# Patient Record
Sex: FEMALE | Race: ASIAN | NOT HISPANIC OR LATINO | Employment: FULL TIME | URBAN - METROPOLITAN AREA
[De-identification: names, ages, dates, MRNs, and addresses within clinical notes are randomized per-mention and may not be internally consistent; named-entity substitution may affect disease eponyms.]

---

## 2017-02-02 ENCOUNTER — ALLSCRIPTS OFFICE VISIT (OUTPATIENT)
Dept: OTHER | Facility: OTHER | Age: 42
End: 2017-02-02

## 2017-02-02 LAB
FLUAV AG SPEC QL IA: NEGATIVE
INFLUENZA B AG (HISTORICAL): NEGATIVE

## 2017-02-03 ENCOUNTER — ALLSCRIPTS OFFICE VISIT (OUTPATIENT)
Dept: OTHER | Facility: OTHER | Age: 42
End: 2017-02-03

## 2017-06-19 DIAGNOSIS — Z13.1 ENCOUNTER FOR SCREENING FOR DIABETES MELLITUS: ICD-10-CM

## 2017-06-19 DIAGNOSIS — E55.9 VITAMIN D DEFICIENCY: ICD-10-CM

## 2017-06-19 DIAGNOSIS — Z13.83 ENCOUNTER FOR SCREENING FOR RESPIRATORY DISORDER: ICD-10-CM

## 2017-06-19 DIAGNOSIS — Z13.89 ENCOUNTER FOR SCREENING FOR OTHER DISORDER: ICD-10-CM

## 2017-06-19 DIAGNOSIS — Z13.6 ENCOUNTER FOR SCREENING FOR CARDIOVASCULAR DISORDERS: ICD-10-CM

## 2017-06-19 DIAGNOSIS — Z00.00 ENCOUNTER FOR GENERAL ADULT MEDICAL EXAMINATION WITHOUT ABNORMAL FINDINGS: ICD-10-CM

## 2017-07-20 ENCOUNTER — GENERIC CONVERSION - ENCOUNTER (OUTPATIENT)
Dept: OTHER | Facility: OTHER | Age: 42
End: 2017-07-20

## 2017-07-20 ENCOUNTER — APPOINTMENT (OUTPATIENT)
Dept: LAB | Facility: CLINIC | Age: 42
End: 2017-07-20
Payer: COMMERCIAL

## 2017-07-20 DIAGNOSIS — Z13.6 ENCOUNTER FOR SCREENING FOR CARDIOVASCULAR DISORDERS: ICD-10-CM

## 2017-07-20 DIAGNOSIS — E55.9 VITAMIN D DEFICIENCY: ICD-10-CM

## 2017-07-20 DIAGNOSIS — Z13.83 ENCOUNTER FOR SCREENING FOR RESPIRATORY DISORDER: ICD-10-CM

## 2017-07-20 DIAGNOSIS — Z00.00 ENCOUNTER FOR GENERAL ADULT MEDICAL EXAMINATION WITHOUT ABNORMAL FINDINGS: ICD-10-CM

## 2017-07-20 DIAGNOSIS — Z13.1 ENCOUNTER FOR SCREENING FOR DIABETES MELLITUS: ICD-10-CM

## 2017-07-20 DIAGNOSIS — Z13.89 ENCOUNTER FOR SCREENING FOR OTHER DISORDER: ICD-10-CM

## 2017-07-20 LAB
25(OH)D3 SERPL-MCNC: 17 NG/ML (ref 30–100)
ALBUMIN SERPL BCP-MCNC: 3.8 G/DL (ref 3.5–5)
ALP SERPL-CCNC: 53 U/L (ref 46–116)
ALT SERPL W P-5'-P-CCNC: 17 U/L (ref 12–78)
ANION GAP SERPL CALCULATED.3IONS-SCNC: 3 MMOL/L (ref 4–13)
AST SERPL W P-5'-P-CCNC: 10 U/L (ref 5–45)
BILIRUB SERPL-MCNC: 0.61 MG/DL (ref 0.2–1)
BUN SERPL-MCNC: 16 MG/DL (ref 5–25)
CALCIUM SERPL-MCNC: 8.8 MG/DL (ref 8.3–10.1)
CHLORIDE SERPL-SCNC: 105 MMOL/L (ref 100–108)
CHOLEST SERPL-MCNC: 211 MG/DL (ref 50–200)
CO2 SERPL-SCNC: 29 MMOL/L (ref 21–32)
CREAT SERPL-MCNC: 0.73 MG/DL (ref 0.6–1.3)
EST. AVERAGE GLUCOSE BLD GHB EST-MCNC: 100 MG/DL
GFR SERPL CREATININE-BSD FRML MDRD: >60 ML/MIN/1.73SQ M
GLUCOSE P FAST SERPL-MCNC: 91 MG/DL (ref 65–99)
HBA1C MFR BLD: 5.1 % (ref 4.2–6.3)
HDLC SERPL-MCNC: 58 MG/DL (ref 40–60)
LDLC SERPL CALC-MCNC: 129 MG/DL (ref 0–100)
POTASSIUM SERPL-SCNC: 3.8 MMOL/L (ref 3.5–5.3)
PROT SERPL-MCNC: 7.5 G/DL (ref 6.4–8.2)
SODIUM SERPL-SCNC: 137 MMOL/L (ref 136–145)
TRIGL SERPL-MCNC: 122 MG/DL

## 2017-07-20 PROCEDURE — 83036 HEMOGLOBIN GLYCOSYLATED A1C: CPT

## 2017-07-20 PROCEDURE — 80061 LIPID PANEL: CPT

## 2017-07-20 PROCEDURE — 82306 VITAMIN D 25 HYDROXY: CPT

## 2017-07-20 PROCEDURE — 36415 COLL VENOUS BLD VENIPUNCTURE: CPT

## 2017-07-20 PROCEDURE — 80053 COMPREHEN METABOLIC PANEL: CPT

## 2017-07-24 ENCOUNTER — ALLSCRIPTS OFFICE VISIT (OUTPATIENT)
Dept: OTHER | Facility: OTHER | Age: 42
End: 2017-07-24

## 2017-07-24 DIAGNOSIS — E55.9 VITAMIN D DEFICIENCY: ICD-10-CM

## 2017-07-24 DIAGNOSIS — Z12.31 ENCOUNTER FOR SCREENING MAMMOGRAM FOR MALIGNANT NEOPLASM OF BREAST: ICD-10-CM

## 2018-01-13 VITALS
TEMPERATURE: 98 F | HEIGHT: 61 IN | DIASTOLIC BLOOD PRESSURE: 66 MMHG | BODY MASS INDEX: 21.52 KG/M2 | WEIGHT: 114 LBS | RESPIRATION RATE: 14 BRPM | SYSTOLIC BLOOD PRESSURE: 100 MMHG | HEART RATE: 82 BPM

## 2018-01-13 NOTE — RESULT NOTES
Verified Results  (1) VITAMIN D 25-HYDROXY [id number] 09:13AM Marcie Golden   TW Order Number: [id number]     Test Name Result Flag Reference   VIT D 25-HYDROX 17 0 ng/mL L 30 0-100 0   This assay is a certified procedure of the CDC Vitamin D Standardization Certification Program (VDSCP)     Deficiency <20ng/ml   Insufficiency 20-30ng/ml   Sufficient  ng/ml     *Patients undergoing fluorescein dye angiography may retain small amounts of fluorescein in the body for 48-72 hours post procedure  Samples containing fluorescein can produce falsely elevated Vitamin D values  If the patient had this procedure, a specimen should be resubmitted post fluorescein clearance

## 2018-01-14 VITALS
WEIGHT: 120 LBS | DIASTOLIC BLOOD PRESSURE: 70 MMHG | BODY MASS INDEX: 22.66 KG/M2 | RESPIRATION RATE: 14 BRPM | HEART RATE: 76 BPM | HEIGHT: 61 IN | TEMPERATURE: 97.9 F | SYSTOLIC BLOOD PRESSURE: 102 MMHG

## 2018-01-17 NOTE — PROGRESS NOTES
Assessment    1  Well adult on routine health check (V70 0) (Z00 00)   2  Vitamin D deficiency (268 9) (E55 9)   3  Elevated HDL (785 9) (E78 89)    Plan  Health Maintenance    · Calcium TABS; Take 1 tablet daily   · Multi-Vitamin TABS; Take once a day  Screening breast examination    · * MAMMO SCREENING BILATERAL W CAD; Status:Active; Requested for:[id number];   Vitamin D deficiency    · Vitamin D (Ergocalciferol) 69077 UNIT Oral Capsule; one capsule weekly for  12 weeks followed 2000u/d of otc vitamin D for maintenance   · Vitamin D 1000 UNIT CAPS; 1 every day   · (1) VITAMIN D 25-HYDROXY; Status:Active; Requested for:49Kmh7588;     Discussion/Summary  The patient was counseled regarding risk factor reductions, impressions  Possible side effects of new medications were reviewed with the patient/guardian today  The treatment plan was reviewed with the patient/guardian  The patient/guardian understands and agrees with the treatment plan      Chief Complaint  Seen for a CPE  er/cma  History of Present Illness  HM, Adult Female: The patient is being seen for a health maintenance evaluation  General Health: The patient's health since the last visit is described as good  Immunizations status: up to date  Lifestyle:  She consumes a diverse and healthy diet  She does not have any weight concerns  She does not use tobacco    Screening:      Review of Systems    Cardiovascular: no chest pain  Respiratory: no shortness of breath  Active Problems    1  Allergic rhinitis (477 9) (J30 9)   2  Body mass index (BMI) of 23 0 to 23 9 in adult (V85 1) (Z68 23)   3  Eczema (692 9) (L30 9)   4  Elevated HDL (785 9) (E78 89)   5  Immunization due (V05 9) (Z23)   6  Oral contraceptive prescribed (V25 01) (Z30 011)   7  Screening breast examination (V76 10) (Z12 31)   8  Screening for cardiovascular, respiratory, and genitourinary diseases   (V81 2,V81 4,V81 6) (Z13 6,Z13 83,Z13 89)   9   Screening for diabetes mellitus (DM) (V77 1) (Z13 1)   10  Thyroid disorder screening (V77 0) (Z13 29)   11  Vitamin D deficiency (268 9) (E55 9)   12  Well adult on routine health check (V70 0) (Z00 00)    Past Medical History    · History of Abnormal blood chemistry (790 6) (R79 9)   · History of Acute viral conjunctivitis of both eyes (077 99) (B30 9)   · History of Closed Fracture Of The Navicular Bone Of The Foot (825 22)   · History of Elevated ALT measurement (790 4) (R74 0)   · History of Exposure to influenza (V01 79) (Z20 828)   · History of hyperlipidemia (V12 29) (Z86 39)   · History of Influenza-like symptoms (780 99) (R68 89)   · Oral contraceptive prescribed (V25 01) (Z30 011)    Family History  Mother    · Family history of Diabetes Mellitus (V18 0)  Father    · Family history of No Significant Family History    Social History    · Being A Social Drinker   · Never A Smoker    Current Meds   1  Calcium TABS; Take 1 tablet daily; Therapy: (Recorded:69Nef8105) to Recorded   2  Multi-Vitamin TABS; Take once a day; Therapy: (Recorded:21Zip4497) to Recorded   3  Vitamin D 1000 UNIT CAPS; 1 every day; Therapy: 82GRP9689 to (Last Rx:63Hlr1329) Ordered    Allergies    1  No Known Drug Allergies    Vitals   Recorded: 24Jul2017 01:48PM   Temperature 97 5 F   Heart Rate 88   Respiration 16   Systolic 294   Diastolic 80   Height 5 ft 0 5 in   Weight 119 lb    BMI Calculated 22 86   BSA Calculated 1 51   LMP [id number]     Physical Exam    Constitutional   General appearance: No acute distress, well appearing and well nourished  Eyes   Conjunctiva and lids: No swelling, erythema or discharge  Pupils and irises: Equal, round, reactive to light  Ears, Nose, Mouth, and Throat   External inspection of ears and nose: Normal     Otoscopic examination: Tympanic membranes translucent with normal light reflex  Canals patent without erythema  Nasal mucosa, septum, and turbinates: Normal without edema or erythema      Lips, teeth, and gums: Normal, good dentition  Oropharynx: Normal with no erythema, edema, exudate or lesions  Neck   Neck: Supple, symmetric, trachea midline, no masses  Pulmonary   Respiratory effort: No increased work of breathing or signs of respiratory distress  Auscultation of lungs: Clear to auscultation  Cardiovascular   Auscultation of heart: Normal rate and rhythm, normal S1 and S2, no murmurs  Carotid pulses: 2+ bilaterally  Pedal pulses: 2+ bilaterally  Examination of extremities for edema and/or varicosities: Normal     Abdomen   Abdomen: Non-tender, no masses  Liver and spleen: No hepatomegaly or splenomegaly  Lymphatic   Palpation of lymph nodes in neck: No lymphadenopathy  Musculoskeletal   Gait and station: Normal     Digits and nails: Normal without clubbing or cyanosis  Joints, bones, and muscles: Normal     Range of motion: Normal     Skin   Skin and subcutaneous tissue: Normal without rashes or lesions  Palpation of skin and subcutaneous tissue: Normal turgor  Neurologic   Reflexes: 2+ and symmetric  Sensation: No sensory loss  Psychiatric   Judgment and insight: Normal     Mood and affect: Normal        Results/Data  [place] Risk for General CVD [id number] 02:08PM Bearl Benjamin     Test Name Result Flag Reference   [place] CVD - Heart Age 40 Years     [place] CVD - Normal 2 5 %     [place] CVD - Ten Year 2 2 %     Sex: Female  Age: 38  Total Cholesterol: 211 mg/dL  HDL Cholesterol: 58 mg/dL  Systolic Blood Pressure: 824 mmHg  Diabetes: No  Smoking Status: No  Being treated for hypertension: No       Procedure    Procedure: Visual Acuity Test    Indication: routine screening  Inforrmation supplied by a Snellen chart     Results: 20/13 in both eyes with corrective device, 20/15 in the right eye with corrective device, 20/20 in the left eye with corrective device      Provider Comments  Provider Comments:   D in 4m, f/u only if needed      Signatures Electronically signed by : Tim Moran DO; Jul 24 2017  3:02PM EST                       (Author)

## 2018-01-18 NOTE — PROGRESS NOTES
Assessment    1  Encounter for preventive health examination (V70 0) (Z00 00)   2  Vitamin D insufficiency (268 9) (E55 9)   3  Elevated HDL (785 9) (E78 89)   4  Body mass index (BMI) 21 0-21 9, adult (V85 1) (Z68 21)    Plan  Body mass index (BMI) 21 0-21 9, adult, Elevated HDL, Vitamin D insufficiency    · Follow-up PRN Evaluation and Treatment  Follow-up  Status: Complete  Done:  [id number]    Discussion/Summary  Advice and education were given regarding calcium supplements and vitamin D supplements  Chief Complaint  pt present for a CPE  pt declines an eye exam  hg      History of Present Illness  HM, Adult Female: The patient is being seen for a health maintenance evaluation  General Health: The patient's health since the last visit is described as good  Immunizations status: up to date  Lifestyle:  She consumes a diverse and healthy diet  She does not use tobacco  She denies alcohol use  Screening:      Review of Systems    Cardiovascular: no chest pain  Respiratory: no shortness of breath  Neurological: no fainting  Active Problems    1  Abnormal blood chemistry (790 6) (R79 9)   2  Allergic rhinitis (477 9) (J30 9)   3  Eczema (692 9) (L30 9)   4  Elevated HDL (785 9) (E78 89)   5  Immunization due (V05 9) (Z23)   6  Oral contraceptive prescribed (V25 01) (Z30 011)   7  Screening for cardiovascular condition (V81 2) (Z13 6)   8  Screening for diabetes mellitus (DM) (V77 1) (Z13 1)   9  Screening for lipid disorders (V77 91) (Z13 220)   10  Thyroid disorder screening (V77 0) (Z13 29)   11  Vitamin D insufficiency (268 9) (E55 9)   12   Well adult on routine health check (V70 0) (Z00 00)    Past Medical History    · History of Closed Fracture Of The Navicular Bone Of The Foot (825 22)   · History of Elevated ALT measurement (790 4) (R74 0)   · History of hyperlipidemia (V12 29) (Z86 39)   · Oral contraceptive prescribed (V25 01) (Z30 011)    Family History  Mother    · Family history of Diabetes Mellitus (V18 0)  Father    · Family history of No Significant Family History    Social History    · Being A Social Drinker   · Never A Smoker    Current Meds   1  Calcium TABS; Therapy: (Recorded:89Dvv7874) to Recorded   2  Levocetirizine Dihydrochloride 5 MG Oral Tablet; 1 every day; Therapy: 24DAK6781 to (Evaluate:09Nov2015); Last Rx:13May2015 Ordered   3  Multi-Vitamin TABS; Take once a day; Therapy: (Recorded:13May2015) to Recorded   4  Vitamin D 1000 UNIT CAPS; 1 every day; Therapy: 51JUC1006 to (Last Rx:13May2015) Ordered    Allergies    1  No Known Drug Allergies    Vitals   Recorded: 09YHF6311 11:58AM   Temperature 98 F    Heart Rate  86   Respiration  16   Systolic 870    Diastolic 60    Height 5 ft 2 in    Weight 116 lb     BMI Calculated 21 22    BSA Calculated 1 52    LMP  42Hfw5835 irregular     Physical Exam    Constitutional   General appearance: No acute distress, well appearing and well nourished  Eyes   Conjunctiva and lids: No swelling, erythema or discharge  Pupils and irises: Equal, round, reactive to light  Ears, Nose, Mouth, and Throat   External inspection of ears and nose: Normal     Otoscopic examination: Tympanic membranes translucent with normal light reflex  Canals patent without erythema  Nasal mucosa, septum, and turbinates: Normal without edema or erythema  Lips, teeth, and gums: Normal, good dentition  Oropharynx: Normal with no erythema, edema, exudate or lesions  Neck   Neck: Supple, symmetric, trachea midline, no masses  Thyroid: Normal, no thyromegaly  Pulmonary   Respiratory effort: No increased work of breathing or signs of respiratory distress  Auscultation of lungs: Clear to auscultation  Cardiovascular   Palpation of heart: Normal PMI, no thrills  Auscultation of heart: Normal rate and rhythm, normal S1 and S2, no murmurs  Pedal pulses: 2+ bilaterally      Examination of extremities for edema and/or varicosities: Normal     Chest   Breasts: Normal, no dimpling or skin changes appreciated  Palpation of breasts and axillae: Normal, no masses palpated  Abdomen   Abdomen: Non-tender, no masses  Liver and spleen: No hepatomegaly or splenomegaly  Lymphatic   Palpation of lymph nodes in neck: No lymphadenopathy  Musculoskeletal   Gait and station: Normal     Digits and nails: Normal without clubbing or cyanosis  Stability: Normal     Skin   Skin and subcutaneous tissue: Normal without rashes or lesions  Palpation of skin and subcutaneous tissue: Normal turgor  Neurologic   Reflexes: 2+ and symmetric  Sensation: No sensory loss      Psychiatric   Judgment and insight: Normal     Mood and affect: Normal        Signatures   Electronically signed by : Foreign Galicia DO; May 17 2016 11:09PM EST                       (Author)

## 2018-01-22 VITALS
RESPIRATION RATE: 16 BRPM | BODY MASS INDEX: 22.47 KG/M2 | HEIGHT: 61 IN | WEIGHT: 119 LBS | TEMPERATURE: 97.5 F | DIASTOLIC BLOOD PRESSURE: 80 MMHG | HEART RATE: 88 BPM | SYSTOLIC BLOOD PRESSURE: 118 MMHG

## 2018-06-22 ENCOUNTER — TELEPHONE (OUTPATIENT)
Dept: FAMILY MEDICINE CLINIC | Facility: CLINIC | Age: 43
End: 2018-06-22

## 2018-06-22 NOTE — TELEPHONE ENCOUNTER
Patient called and states she needs a bloodwork order before her CPE appt in August     She stated it needs to include all the blood work that is needed for Kamari Yoo 187 with you     Please call her and let her know it is done

## 2018-06-24 DIAGNOSIS — Z13.83 SCREENING FOR CARDIOVASCULAR, RESPIRATORY, AND GENITOURINARY DISEASES: ICD-10-CM

## 2018-06-24 DIAGNOSIS — Z13.1 SCREENING FOR DIABETES MELLITUS (DM): ICD-10-CM

## 2018-06-24 DIAGNOSIS — Z13.6 SCREENING FOR CARDIOVASCULAR, RESPIRATORY, AND GENITOURINARY DISEASES: ICD-10-CM

## 2018-06-24 DIAGNOSIS — Z13.89 SCREENING FOR CARDIOVASCULAR, RESPIRATORY, AND GENITOURINARY DISEASES: ICD-10-CM

## 2018-06-24 DIAGNOSIS — E55.9 VITAMIN D DEFICIENCY: Primary | ICD-10-CM

## 2018-06-24 RX ORDER — MULTIVITAMIN
TABLET ORAL
COMMUNITY

## 2018-06-24 RX ORDER — MULTIVIT-MIN/IRON/FOLIC ACID/K 18-600-40
CAPSULE ORAL DAILY
COMMUNITY
Start: 2015-05-13

## 2018-07-25 ENCOUNTER — TRANSCRIBE ORDERS (OUTPATIENT)
Dept: ADMINISTRATIVE | Facility: HOSPITAL | Age: 43
End: 2018-07-25

## 2018-07-25 ENCOUNTER — APPOINTMENT (OUTPATIENT)
Dept: LAB | Facility: HOSPITAL | Age: 43
End: 2018-07-25
Attending: FAMILY MEDICINE
Payer: COMMERCIAL

## 2018-07-25 DIAGNOSIS — Z13.6 SCREENING FOR CARDIOVASCULAR, RESPIRATORY, AND GENITOURINARY DISEASES: ICD-10-CM

## 2018-07-25 DIAGNOSIS — Z13.89 SCREENING FOR CARDIOVASCULAR, RESPIRATORY, AND GENITOURINARY DISEASES: ICD-10-CM

## 2018-07-25 DIAGNOSIS — Z13.83 SCREENING FOR CARDIOVASCULAR, RESPIRATORY, AND GENITOURINARY DISEASES: ICD-10-CM

## 2018-07-25 DIAGNOSIS — Z13.1 SCREENING FOR DIABETES MELLITUS (DM): ICD-10-CM

## 2018-07-25 DIAGNOSIS — E55.9 VITAMIN D DEFICIENCY: ICD-10-CM

## 2018-07-25 LAB
25(OH)D3 SERPL-MCNC: 19.3 NG/ML (ref 30–100)
ALBUMIN SERPL BCP-MCNC: 3.6 G/DL (ref 3.5–5)
ALP SERPL-CCNC: 48 U/L (ref 46–116)
ALT SERPL W P-5'-P-CCNC: 16 U/L (ref 12–78)
ANION GAP SERPL CALCULATED.3IONS-SCNC: 7 MMOL/L (ref 4–13)
AST SERPL W P-5'-P-CCNC: 13 U/L (ref 5–45)
BILIRUB SERPL-MCNC: 0.7 MG/DL (ref 0.2–1)
BUN SERPL-MCNC: 13 MG/DL (ref 5–25)
CALCIUM SERPL-MCNC: 8.6 MG/DL (ref 8.3–10.1)
CHLORIDE SERPL-SCNC: 105 MMOL/L (ref 100–108)
CHOLEST SERPL-MCNC: 180 MG/DL (ref 50–200)
CO2 SERPL-SCNC: 29 MMOL/L (ref 21–32)
CREAT SERPL-MCNC: 0.74 MG/DL (ref 0.6–1.3)
EST. AVERAGE GLUCOSE BLD GHB EST-MCNC: 100 MG/DL
GFR SERPL CREATININE-BSD FRML MDRD: 101 ML/MIN/1.73SQ M
GLUCOSE P FAST SERPL-MCNC: 98 MG/DL (ref 65–99)
HBA1C MFR BLD: 5.1 % (ref 4.2–6.3)
HDLC SERPL-MCNC: 59 MG/DL (ref 40–60)
LDLC SERPL CALC-MCNC: 107 MG/DL (ref 0–100)
POTASSIUM SERPL-SCNC: 3.8 MMOL/L (ref 3.5–5.3)
PROT SERPL-MCNC: 7 G/DL (ref 6.4–8.2)
SODIUM SERPL-SCNC: 141 MMOL/L (ref 136–145)
TRIGL SERPL-MCNC: 69 MG/DL

## 2018-07-25 PROCEDURE — 36415 COLL VENOUS BLD VENIPUNCTURE: CPT

## 2018-07-25 PROCEDURE — 80053 COMPREHEN METABOLIC PANEL: CPT

## 2018-07-25 PROCEDURE — 80061 LIPID PANEL: CPT

## 2018-07-25 PROCEDURE — 83036 HEMOGLOBIN GLYCOSYLATED A1C: CPT

## 2018-07-25 PROCEDURE — 82306 VITAMIN D 25 HYDROXY: CPT

## 2018-08-28 ENCOUNTER — OFFICE VISIT (OUTPATIENT)
Dept: FAMILY MEDICINE CLINIC | Facility: CLINIC | Age: 43
End: 2018-08-28
Payer: COMMERCIAL

## 2018-08-28 VITALS
HEIGHT: 62 IN | BODY MASS INDEX: 21.71 KG/M2 | RESPIRATION RATE: 16 BRPM | HEART RATE: 64 BPM | SYSTOLIC BLOOD PRESSURE: 102 MMHG | DIASTOLIC BLOOD PRESSURE: 60 MMHG | WEIGHT: 118 LBS | TEMPERATURE: 98.4 F

## 2018-08-28 DIAGNOSIS — Z00.00 HEALTHCARE MAINTENANCE: Primary | ICD-10-CM

## 2018-08-28 DIAGNOSIS — E55.9 VITAMIN D DEFICIENCY: ICD-10-CM

## 2018-08-28 DIAGNOSIS — Z12.39 BREAST CANCER SCREENING: ICD-10-CM

## 2018-08-28 PROCEDURE — 99396 PREV VISIT EST AGE 40-64: CPT | Performed by: FAMILY MEDICINE

## 2018-08-28 RX ORDER — ERGOCALCIFEROL 1.25 MG/1
50000 CAPSULE ORAL WEEKLY
Qty: 12 CAPSULE | Refills: 0 | Status: SHIPPED | OUTPATIENT
Start: 2018-08-28 | End: 2021-08-08

## 2018-08-28 NOTE — PROGRESS NOTES
Assessment/Plan:    No problem-specific Assessment & Plan notes found for this encounter  cpe done  First mammo ordered again  otc vit D after rx and recheck 4m     Diagnoses and all orders for this visit:    Healthcare maintenance    Vitamin D deficiency  -     ergocalciferol (VITAMIN D2) 50,000 units; Take 1 capsule (50,000 Units total) by mouth once a week for 90 days Then 2000units/d of otc Vitamin D  -     Vitamin D 25 hydroxy; Future    Breast cancer screening  -     Mammo screening bilateral w cad; Future              No Follow-up on file  Subjective:      Patient ID: aKdy Jeffers is a 41 y o  female  Chief Complaint   Patient presents with    Physical Exam     no forms drhlpn       HPI  Working in [employer]  Exercises most days  Eating better  Took drisdol last year, on daily 1600u/d    The following portions of the patient's history were reviewed and updated as appropriate: allergies, current medications, past family history, past medical history, past social history, past surgical history and problem list     Review of Systems   Respiratory: Negative for shortness of breath  Cardiovascular: Negative for chest pain  Gastrointestinal: Negative for blood in stool  Genitourinary: Negative for difficulty urinating  Current Outpatient Prescriptions   Medication Sig Dispense Refill    Cholecalciferol (VITAMIN D) 2000 units CAPS Take by mouth daily      Multiple Vitamin (MULTI-VITAMIN DAILY) TABS Take by mouth      ergocalciferol (VITAMIN D2) 50,000 units Take 1 capsule (50,000 Units total) by mouth once a week for 90 days Then 2000units/d of otc Vitamin D 12 capsule 0     No current facility-administered medications for this visit  Objective:    /60   Pulse 64   Temp 98 4 °F (36 9 °C)   Resp 16   Ht 5' 2" (1 575 m)   Wt 53 5 kg (118 lb)   LMP 08/25/2018   BMI 21 58 kg/m²        Physical Exam   Constitutional: She appears well-developed     HENT:   Head: Normocephalic  Eyes: Conjunctivae are normal    Neck: Neck supple  Cardiovascular: Normal rate and intact distal pulses  Pulmonary/Chest: Effort normal  No respiratory distress  Abdominal: Soft  Musculoskeletal: She exhibits no edema or deformity  Neurological: She is alert  Skin: Skin is warm and dry  Psychiatric: Her behavior is normal  Thought content normal    Nursing note and vitals reviewed               Casandra Clifton DO

## 2021-01-04 ENCOUNTER — IMMUNIZATIONS (OUTPATIENT)
Dept: FAMILY MEDICINE CLINIC | Facility: HOSPITAL | Age: 46
End: 2021-01-04

## 2021-01-04 DIAGNOSIS — Z23 ENCOUNTER FOR IMMUNIZATION: ICD-10-CM

## 2021-01-04 PROCEDURE — 0011A SARS-COV-2 / COVID-19 MRNA VACCINE (MODERNA) 100 MCG: CPT

## 2021-01-04 PROCEDURE — 91301 SARS-COV-2 / COVID-19 MRNA VACCINE (MODERNA) 100 MCG: CPT

## 2021-02-05 ENCOUNTER — IMMUNIZATIONS (OUTPATIENT)
Dept: FAMILY MEDICINE CLINIC | Facility: HOSPITAL | Age: 46
End: 2021-02-05

## 2021-02-05 DIAGNOSIS — Z23 ENCOUNTER FOR IMMUNIZATION: Primary | ICD-10-CM

## 2021-02-05 PROCEDURE — 0012A SARS-COV-2 / COVID-19 MRNA VACCINE (MODERNA) 100 MCG: CPT

## 2021-02-05 PROCEDURE — 91301 SARS-COV-2 / COVID-19 MRNA VACCINE (MODERNA) 100 MCG: CPT

## 2021-03-17 ENCOUNTER — TELEPHONE (OUTPATIENT)
Dept: FAMILY MEDICINE CLINIC | Facility: CLINIC | Age: 46
End: 2021-03-17

## 2021-03-24 NOTE — TELEPHONE ENCOUNTER
Spoke to patient, she said she has been busy and will call back to schedule her CPE after checking her schedule     Mercedes Chery MA

## 2021-07-01 ENCOUNTER — TELEPHONE (OUTPATIENT)
Dept: FAMILY MEDICINE CLINIC | Facility: HOSPITAL | Age: 46
End: 2021-07-01

## 2021-07-01 DIAGNOSIS — Z13.6 SCREENING FOR CARDIOVASCULAR, RESPIRATORY, AND GENITOURINARY DISEASES: ICD-10-CM

## 2021-07-01 DIAGNOSIS — Z13.1 SCREENING FOR DIABETES MELLITUS (DM): ICD-10-CM

## 2021-07-01 DIAGNOSIS — Z13.83 SCREENING FOR CARDIOVASCULAR, RESPIRATORY, AND GENITOURINARY DISEASES: ICD-10-CM

## 2021-07-01 DIAGNOSIS — Z13.89 SCREENING FOR CARDIOVASCULAR, RESPIRATORY, AND GENITOURINARY DISEASES: ICD-10-CM

## 2021-07-01 DIAGNOSIS — E55.9 VITAMIN D DEFICIENCY: Primary | ICD-10-CM

## 2021-08-08 RX ORDER — PSYLLIUM HUSK 0.4 G
CAPSULE ORAL
COMMUNITY

## 2021-08-09 ENCOUNTER — APPOINTMENT (OUTPATIENT)
Dept: LAB | Facility: CLINIC | Age: 46
End: 2021-08-09
Payer: COMMERCIAL

## 2021-08-09 ENCOUNTER — APPOINTMENT (OUTPATIENT)
Dept: LAB | Facility: CLINIC | Age: 46
End: 2021-08-09

## 2021-08-09 DIAGNOSIS — Z13.83 SCREENING FOR CARDIOVASCULAR, RESPIRATORY, AND GENITOURINARY DISEASES: ICD-10-CM

## 2021-08-09 DIAGNOSIS — Z13.1 SCREENING FOR DIABETES MELLITUS (DM): ICD-10-CM

## 2021-08-09 DIAGNOSIS — E55.9 VITAMIN D DEFICIENCY: ICD-10-CM

## 2021-08-09 DIAGNOSIS — Z13.89 SCREENING FOR CARDIOVASCULAR, RESPIRATORY, AND GENITOURINARY DISEASES: ICD-10-CM

## 2021-08-09 DIAGNOSIS — Z13.6 SCREENING FOR CARDIOVASCULAR, RESPIRATORY, AND GENITOURINARY DISEASES: ICD-10-CM

## 2021-08-09 LAB
25(OH)D3 SERPL-MCNC: 31.4 NG/ML (ref 30–100)
ALBUMIN SERPL BCP-MCNC: 3.6 G/DL (ref 3.5–5)
ALP SERPL-CCNC: 54 U/L (ref 46–116)
ALT SERPL W P-5'-P-CCNC: 16 U/L (ref 12–78)
ANION GAP SERPL CALCULATED.3IONS-SCNC: 3 MMOL/L (ref 4–13)
AST SERPL W P-5'-P-CCNC: 10 U/L (ref 5–45)
BILIRUB SERPL-MCNC: 0.68 MG/DL (ref 0.2–1)
BUN SERPL-MCNC: 10 MG/DL (ref 5–25)
CALCIUM SERPL-MCNC: 8.9 MG/DL (ref 8.3–10.1)
CHLORIDE SERPL-SCNC: 107 MMOL/L (ref 100–108)
CHOLEST SERPL-MCNC: 208 MG/DL (ref 50–200)
CO2 SERPL-SCNC: 27 MMOL/L (ref 21–32)
CREAT SERPL-MCNC: 0.68 MG/DL (ref 0.6–1.3)
EST. AVERAGE GLUCOSE BLD GHB EST-MCNC: 100 MG/DL
GFR SERPL CREATININE-BSD FRML MDRD: 107 ML/MIN/1.73SQ M
GLUCOSE P FAST SERPL-MCNC: 95 MG/DL (ref 65–99)
HBA1C MFR BLD: 5.1 %
HDLC SERPL-MCNC: 53 MG/DL
LDLC SERPL CALC-MCNC: 134 MG/DL (ref 0–100)
POTASSIUM SERPL-SCNC: 3.8 MMOL/L (ref 3.5–5.3)
PROT SERPL-MCNC: 7.2 G/DL (ref 6.4–8.2)
SODIUM SERPL-SCNC: 137 MMOL/L (ref 136–145)
TRIGL SERPL-MCNC: 104 MG/DL

## 2021-08-09 PROCEDURE — 80053 COMPREHEN METABOLIC PANEL: CPT

## 2021-08-09 PROCEDURE — 82306 VITAMIN D 25 HYDROXY: CPT

## 2021-08-09 PROCEDURE — 80061 LIPID PANEL: CPT

## 2021-08-09 PROCEDURE — 83036 HEMOGLOBIN GLYCOSYLATED A1C: CPT

## 2021-08-09 PROCEDURE — 36415 COLL VENOUS BLD VENIPUNCTURE: CPT

## 2021-08-10 ENCOUNTER — OFFICE VISIT (OUTPATIENT)
Dept: FAMILY MEDICINE CLINIC | Facility: CLINIC | Age: 46
End: 2021-08-10
Payer: COMMERCIAL

## 2021-08-10 VITALS
WEIGHT: 125 LBS | DIASTOLIC BLOOD PRESSURE: 80 MMHG | HEIGHT: 61 IN | SYSTOLIC BLOOD PRESSURE: 110 MMHG | RESPIRATION RATE: 16 BRPM | TEMPERATURE: 98.5 F | OXYGEN SATURATION: 99 % | HEART RATE: 72 BPM | BODY MASS INDEX: 23.6 KG/M2

## 2021-08-10 DIAGNOSIS — Z00.00 HEALTHCARE MAINTENANCE: Primary | ICD-10-CM

## 2021-08-10 DIAGNOSIS — Z86.39 HISTORY OF VITAMIN D DEFICIENCY: ICD-10-CM

## 2021-08-10 DIAGNOSIS — Z91.89 FRAMINGHAM CARDIAC RISK <10% IN NEXT 10 YEARS: ICD-10-CM

## 2021-08-10 DIAGNOSIS — Z12.31 BREAST CANCER SCREENING BY MAMMOGRAM: ICD-10-CM

## 2021-08-10 PROCEDURE — 99396 PREV VISIT EST AGE 40-64: CPT | Performed by: FAMILY MEDICINE

## 2021-08-10 RX ORDER — CLOBETASOL PROPIONATE 0.5 MG/G
CREAM TOPICAL
COMMUNITY
Start: 2021-07-16

## 2021-08-10 NOTE — PROGRESS NOTES
Assessment/Plan:    No problem-specific Assessment & Plan notes found for this encounter  cpe  Local gyn for pap  mammo ordered  Continue vit D, could take a little more     Diagnoses and all orders for this visit:    Healthcare maintenance  -     Ambulatory referral to Obstetrics / Gynecology; Future    [place] cardiac risk <10% in next 10 years    Breast cancer screening by mammogram  -     Mammo screening bilateral w 3d & cad; Future    History of vitamin D deficiency    Other orders  -     Calcium Carb-Cholecalciferol (Calcium 1000 + D) 1000-800 MG-UNIT TABS  -     clobetasol (TEMOVATE) 0 05 % cream; As Needed        Return if symptoms worsen or fail to improve  Subjective:      Patient ID: Phill Romano is a 42 y o  female  Chief Complaint   Patient presents with    Physical Exam     wmcma       HPI  Here for cpe  Small wt gain  Eats rice  Not limiting carbs much  2x/w exercise  2 kids, ages 12 and 8    Now on vit D 2K daily  D level is good    The following portions of the patient's history were reviewed and updated as appropriate: allergies, current medications, past family history, past medical history, past social history, past surgical history and problem list     Review of Systems   Constitutional: Negative for fever  Respiratory: Negative for shortness of breath  Current Outpatient Medications   Medication Sig Dispense Refill    Calcium Carb-Cholecalciferol (Calcium 1000 + D) 1000-800 MG-UNIT TABS       Cholecalciferol (VITAMIN D) 2000 units CAPS Take by mouth daily      clobetasol (TEMOVATE) 0 05 % cream As Needed      Multiple Vitamin (MULTI-VITAMIN DAILY) TABS Take by mouth       No current facility-administered medications for this visit  Objective:    /80   Pulse 72   Temp 98 5 °F (36 9 °C)   Resp 16   Ht 5' 1" (1 549 m)   Wt 56 7 kg (125 lb)   SpO2 99%   BMI 23 62 kg/m²        Physical Exam  Vitals and nursing note reviewed     Constitutional: General: She is not in acute distress  Appearance: She is well-developed  She is not ill-appearing  HENT:      Head: Normocephalic  Right Ear: Tympanic membrane normal       Left Ear: Tympanic membrane normal    Eyes:      General: No scleral icterus  Conjunctiva/sclera: Conjunctivae normal    Neck:      Thyroid: No thyromegaly  Cardiovascular:      Rate and Rhythm: Normal rate and regular rhythm  Heart sounds: No murmur heard  Pulmonary:      Effort: Pulmonary effort is normal  No respiratory distress  Breath sounds: No wheezing  Abdominal:      General: There is no distension  Palpations: Abdomen is soft  Tenderness: There is no abdominal tenderness  Musculoskeletal:         General: No deformity  Cervical back: Neck supple  No tenderness  Right lower leg: No edema  Left lower leg: No edema  Skin:     General: Skin is warm and dry  Coloration: Skin is not pale  Neurological:      Mental Status: She is alert  Motor: No weakness  Gait: Gait normal    Psychiatric:         Mood and Affect: Mood normal          Behavior: Behavior normal          Thought Content:  Thought content normal                 Alan Raman DO

## 2021-12-17 ENCOUNTER — IMMUNIZATIONS (OUTPATIENT)
Dept: FAMILY MEDICINE CLINIC | Facility: HOSPITAL | Age: 46
End: 2021-12-17

## 2021-12-17 DIAGNOSIS — Z23 ENCOUNTER FOR IMMUNIZATION: Primary | ICD-10-CM

## 2021-12-17 PROCEDURE — 91306 COVID-19 MODERNA VACC 0.25 ML BOOSTER: CPT

## 2021-12-17 PROCEDURE — 0064A COVID-19 MODERNA VACC 0.25 ML BOOSTER: CPT

## 2022-08-11 ENCOUNTER — APPOINTMENT (OUTPATIENT)
Dept: LAB | Facility: CLINIC | Age: 47
End: 2022-08-11

## 2022-08-11 ENCOUNTER — TRANSCRIBE ORDERS (OUTPATIENT)
Dept: LAB | Facility: CLINIC | Age: 47
End: 2022-08-11

## 2022-08-11 DIAGNOSIS — Z00.8 ENCOUNTER FOR OTHER GENERAL EXAMINATION: Primary | ICD-10-CM

## 2022-08-11 LAB
CHOLEST SERPL-MCNC: 214 MG/DL
EST. AVERAGE GLUCOSE BLD GHB EST-MCNC: 103 MG/DL
HBA1C MFR BLD: 5.2 %
HDLC SERPL-MCNC: 54 MG/DL
LDLC SERPL CALC-MCNC: 130 MG/DL (ref 0–100)
NONHDLC SERPL-MCNC: 160 MG/DL
TRIGL SERPL-MCNC: 151 MG/DL

## 2022-08-11 PROCEDURE — 36415 COLL VENOUS BLD VENIPUNCTURE: CPT

## 2022-08-11 PROCEDURE — 80061 LIPID PANEL: CPT

## 2022-08-11 PROCEDURE — 83036 HEMOGLOBIN GLYCOSYLATED A1C: CPT

## 2022-08-12 ENCOUNTER — OFFICE VISIT (OUTPATIENT)
Dept: FAMILY MEDICINE CLINIC | Facility: CLINIC | Age: 47
End: 2022-08-12
Payer: COMMERCIAL

## 2022-08-12 VITALS
BODY MASS INDEX: 22.92 KG/M2 | HEIGHT: 61 IN | WEIGHT: 121.4 LBS | SYSTOLIC BLOOD PRESSURE: 116 MMHG | RESPIRATION RATE: 17 BRPM | DIASTOLIC BLOOD PRESSURE: 84 MMHG | TEMPERATURE: 96.9 F | HEART RATE: 95 BPM | OXYGEN SATURATION: 97 %

## 2022-08-12 DIAGNOSIS — Z13.1 SCREENING FOR DIABETES MELLITUS (DM): ICD-10-CM

## 2022-08-12 DIAGNOSIS — Z12.31 BREAST CANCER SCREENING BY MAMMOGRAM: ICD-10-CM

## 2022-08-12 DIAGNOSIS — Z86.39 HISTORY OF VITAMIN D DEFICIENCY: ICD-10-CM

## 2022-08-12 DIAGNOSIS — Z00.00 HEALTHCARE MAINTENANCE: Primary | ICD-10-CM

## 2022-08-12 DIAGNOSIS — Z12.11 COLON CANCER SCREENING: ICD-10-CM

## 2022-08-12 DIAGNOSIS — R53.83 OTHER FATIGUE: ICD-10-CM

## 2022-08-12 PROCEDURE — 99396 PREV VISIT EST AGE 40-64: CPT | Performed by: FAMILY MEDICINE

## 2022-08-12 NOTE — PROGRESS NOTES
Assessment/Plan:    No problem-specific Assessment & Plan notes found for this encounter  cpe  Update labs  Limit carbs  Take her vit D daily     Diagnoses and all orders for this visit:    Healthcare maintenance    Colon cancer screening  -     Ambulatory referral for colonoscopy; Future    Breast cancer screening by mammogram  -     Mammo screening bilateral w 3d & cad; Future    Screening for diabetes mellitus (DM)  -     Comprehensive metabolic panel; Future    Other fatigue  -     CBC and differential; Future  -     TSH, 3rd generation; Future    History of vitamin D deficiency  -     Vitamin D 25 hydroxy; Future        Return if symptoms worsen or fail to improve  Subjective:      Patient ID: Nichole Muñiz is a 41 y o  female  Chief Complaint   Patient presents with    Annual Exam     Nm lpn       HPI  Still at [facility]  Exercises walking  Eating more veggies  Less rice  Not much water  No blood in stool  Takes vit D, forgets 1/2 of the time  Gyn Dr Jacky Ahn    The following portions of the patient's history were reviewed and updated as appropriate: allergies, current medications, past family history, past medical history, past social history, past surgical history and problem list     Review of Systems   Constitutional: Negative for fever  Respiratory: Negative for shortness of breath  Current Outpatient Medications   Medication Sig Dispense Refill    Calcium Carb-Cholecalciferol (Calcium 1000 + D) 1000-800 MG-UNIT TABS       Cholecalciferol (VITAMIN D) 2000 units CAPS Take by mouth daily      clobetasol (TEMOVATE) 0 05 % cream As Needed      Multiple Vitamin (MULTI-VITAMIN DAILY) TABS Take by mouth       No current facility-administered medications for this visit         Objective:    /84   Pulse 95   Temp (!) 96 9 °F (36 1 °C)   Resp 17   Ht 5' 1" (1 549 m)   Wt 55 1 kg (121 lb 6 4 oz)   LMP 08/05/2022 (Exact Date)   SpO2 97%   BMI 22 94 kg/m²        Physical Exam  Vitals and nursing note reviewed  Constitutional:       General: She is not in acute distress  Appearance: She is well-developed  She is not ill-appearing  HENT:      Head: Normocephalic  Right Ear: Tympanic membrane normal       Left Ear: Tympanic membrane normal    Eyes:      General: No scleral icterus  Conjunctiva/sclera: Conjunctivae normal    Neck:      Vascular: No carotid bruit  Cardiovascular:      Rate and Rhythm: Normal rate and regular rhythm  Heart sounds: No murmur heard  Pulmonary:      Effort: Pulmonary effort is normal  No respiratory distress  Breath sounds: No wheezing  Abdominal:      General: There is no distension  Palpations: Abdomen is soft  Tenderness: There is no abdominal tenderness  Musculoskeletal:         General: No deformity  Cervical back: Neck supple  Right lower leg: No edema  Left lower leg: No edema  Skin:     General: Skin is warm and dry  Coloration: Skin is not jaundiced or pale  Neurological:      Mental Status: She is alert  Motor: No weakness  Gait: Gait normal    Psychiatric:         Mood and Affect: Mood normal          Behavior: Behavior normal          Thought Content:  Thought content normal                 Tim Moran, DO

## 2022-08-22 ENCOUNTER — APPOINTMENT (OUTPATIENT)
Dept: LAB | Facility: CLINIC | Age: 47
End: 2022-08-22
Payer: COMMERCIAL

## 2022-08-22 DIAGNOSIS — Z13.1 SCREENING FOR DIABETES MELLITUS (DM): ICD-10-CM

## 2022-08-22 DIAGNOSIS — R53.83 OTHER FATIGUE: ICD-10-CM

## 2022-08-22 DIAGNOSIS — Z86.39 HISTORY OF VITAMIN D DEFICIENCY: ICD-10-CM

## 2022-08-22 LAB
25(OH)D3 SERPL-MCNC: 41.5 NG/ML (ref 30–100)
ALBUMIN SERPL BCP-MCNC: 3.5 G/DL (ref 3.5–5)
ALP SERPL-CCNC: 55 U/L (ref 46–116)
ALT SERPL W P-5'-P-CCNC: 17 U/L (ref 12–78)
ANION GAP SERPL CALCULATED.3IONS-SCNC: 2 MMOL/L (ref 4–13)
AST SERPL W P-5'-P-CCNC: 14 U/L (ref 5–45)
BASOPHILS # BLD AUTO: 0.11 THOUSANDS/ΜL (ref 0–0.1)
BASOPHILS NFR BLD AUTO: 2 % (ref 0–1)
BILIRUB SERPL-MCNC: 0.45 MG/DL (ref 0.2–1)
BUN SERPL-MCNC: 11 MG/DL (ref 5–25)
CALCIUM SERPL-MCNC: 9.1 MG/DL (ref 8.3–10.1)
CHLORIDE SERPL-SCNC: 104 MMOL/L (ref 96–108)
CO2 SERPL-SCNC: 29 MMOL/L (ref 21–32)
CREAT SERPL-MCNC: 0.66 MG/DL (ref 0.6–1.3)
EOSINOPHIL # BLD AUTO: 0.17 THOUSAND/ΜL (ref 0–0.61)
EOSINOPHIL NFR BLD AUTO: 3 % (ref 0–6)
ERYTHROCYTE [DISTWIDTH] IN BLOOD BY AUTOMATED COUNT: 11.9 % (ref 11.6–15.1)
GFR SERPL CREATININE-BSD FRML MDRD: 107 ML/MIN/1.73SQ M
GLUCOSE P FAST SERPL-MCNC: 94 MG/DL (ref 65–99)
HCT VFR BLD AUTO: 41.4 % (ref 34.8–46.1)
HGB BLD-MCNC: 13.3 G/DL (ref 11.5–15.4)
IMM GRANULOCYTES # BLD AUTO: 0.04 THOUSAND/UL (ref 0–0.2)
IMM GRANULOCYTES NFR BLD AUTO: 1 % (ref 0–2)
LYMPHOCYTES # BLD AUTO: 1.93 THOUSANDS/ΜL (ref 0.6–4.47)
LYMPHOCYTES NFR BLD AUTO: 30 % (ref 14–44)
MCH RBC QN AUTO: 30.9 PG (ref 26.8–34.3)
MCHC RBC AUTO-ENTMCNC: 32.1 G/DL (ref 31.4–37.4)
MCV RBC AUTO: 96 FL (ref 82–98)
MONOCYTES # BLD AUTO: 0.55 THOUSAND/ΜL (ref 0.17–1.22)
MONOCYTES NFR BLD AUTO: 9 % (ref 4–12)
NEUTROPHILS # BLD AUTO: 3.6 THOUSANDS/ΜL (ref 1.85–7.62)
NEUTS SEG NFR BLD AUTO: 55 % (ref 43–75)
NRBC BLD AUTO-RTO: 0 /100 WBCS
PLATELET # BLD AUTO: 409 THOUSANDS/UL (ref 149–390)
PMV BLD AUTO: 9.8 FL (ref 8.9–12.7)
POTASSIUM SERPL-SCNC: 3.8 MMOL/L (ref 3.5–5.3)
PROT SERPL-MCNC: 7.1 G/DL (ref 6.4–8.4)
RBC # BLD AUTO: 4.31 MILLION/UL (ref 3.81–5.12)
SODIUM SERPL-SCNC: 135 MMOL/L (ref 135–147)
TSH SERPL DL<=0.05 MIU/L-ACNC: 1.3 UIU/ML (ref 0.45–4.5)
WBC # BLD AUTO: 6.4 THOUSAND/UL (ref 4.31–10.16)

## 2022-08-22 PROCEDURE — 84443 ASSAY THYROID STIM HORMONE: CPT

## 2022-08-22 PROCEDURE — 36415 COLL VENOUS BLD VENIPUNCTURE: CPT

## 2022-08-22 PROCEDURE — 82306 VITAMIN D 25 HYDROXY: CPT

## 2022-08-22 PROCEDURE — 80053 COMPREHEN METABOLIC PANEL: CPT

## 2022-08-22 PROCEDURE — 85025 COMPLETE CBC W/AUTO DIFF WBC: CPT

## 2022-09-13 ENCOUNTER — HOSPITAL ENCOUNTER (OUTPATIENT)
Dept: RADIOLOGY | Facility: HOSPITAL | Age: 47
Discharge: HOME | End: 2022-09-13
Attending: FAMILY MEDICINE
Payer: COMMERCIAL

## 2022-09-13 VITALS — HEIGHT: 61 IN | BODY MASS INDEX: 22.84 KG/M2 | WEIGHT: 121 LBS

## 2022-09-13 DIAGNOSIS — Z12.31 SCREENING MAMMOGRAM FOR HIGH-RISK PATIENT: ICD-10-CM

## 2022-09-13 PROCEDURE — 77063 BREAST TOMOSYNTHESIS BI: CPT

## 2022-09-13 PROCEDURE — 77067 SCR MAMMO BI INCL CAD: CPT

## 2022-10-11 PROBLEM — Z12.11 COLON CANCER SCREENING: Status: RESOLVED | Noted: 2022-08-12 | Resolved: 2022-10-11

## 2022-10-17 ENCOUNTER — OFFICE VISIT (OUTPATIENT)
Dept: OBGYN CLINIC | Facility: CLINIC | Age: 47
End: 2022-10-17
Payer: COMMERCIAL

## 2022-10-17 VITALS
BODY MASS INDEX: 23.22 KG/M2 | WEIGHT: 123 LBS | HEIGHT: 61 IN | DIASTOLIC BLOOD PRESSURE: 80 MMHG | SYSTOLIC BLOOD PRESSURE: 120 MMHG

## 2022-10-17 DIAGNOSIS — Z01.419 ENCOUNTER FOR GYNECOLOGICAL EXAMINATION WITHOUT ABNORMAL FINDING: Primary | ICD-10-CM

## 2022-10-17 DIAGNOSIS — Z12.31 ENCOUNTER FOR SCREENING MAMMOGRAM FOR MALIGNANT NEOPLASM OF BREAST: ICD-10-CM

## 2022-10-17 PROCEDURE — S0610 ANNUAL GYNECOLOGICAL EXAMINA: HCPCS | Performed by: OBSTETRICS & GYNECOLOGY

## 2022-10-17 PROCEDURE — G0476 HPV COMBO ASSAY CA SCREEN: HCPCS | Performed by: OBSTETRICS & GYNECOLOGY

## 2022-10-17 PROCEDURE — G0145 SCR C/V CYTO,THINLAYER,RESCR: HCPCS | Performed by: OBSTETRICS & GYNECOLOGY

## 2022-10-17 NOTE — PROGRESS NOTES
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  Current contraception: condoms  History of abnormal Pap smear: no, last about 12 years ago  Family history of uterine or ovarian cancer: no  Regular self breast exam: yes  History of abnormal mammogram: no  Family history of breast cancer: no    Menstrual History:  OB History        2    Para   2    Term   2            AB        Living           SAB        IAB        Ectopic        Multiple        Live Births                    Menarche age: 17  Patient's last menstrual period was 10/02/2022 (exact date)  Past Medical History:   Diagnosis Date   • History of closed fracture 2002    Navicular bone of foot   • Hyperlipidemia 2013    resolved 2015     History reviewed  No pertinent surgical history  OB History        2    Para   2    Term   2            AB        Living           SAB        IAB        Ectopic        Multiple        Live Births                   Current Outpatient Medications   Medication Instructions   • Calcium Carb-Cholecalciferol (Calcium 1000 + D) 1000-800 MG-UNIT TABS No dose, route, or frequency recorded  • Cholecalciferol (VITAMIN D) 2000 units CAPS Oral, Daily   • clobetasol (TEMOVATE) 0 05 % cream As Needed   • Multiple Vitamin (MULTI-VITAMIN DAILY) TABS Oral     No Known Allergies  Social History     Tobacco Use   • Smoking status: Never Smoker   • Smokeless tobacco: Never Used   Vaping Use   • Vaping Use: Never used   Substance Use Topics   • Alcohol use:  Yes     Alcohol/week: 2 0 standard drinks     Types: 2 Cans of beer per week     Comment: Occasional   • Drug use: Never Review of Systems  Review of Systems   Constitutional: Negative for fatigue, fever and unexpected weight change  HENT: Negative for dental problem, sinus pressure and sinus pain  Eyes: Negative for visual disturbance  Respiratory: Negative for cough, shortness of breath and wheezing  Cardiovascular: Negative for chest pain and leg swelling  Gastrointestinal: Negative for blood in stool, constipation, diarrhea, nausea and vomiting  Endocrine: Negative for cold intolerance, heat intolerance and polydipsia  Genitourinary: Negative for dysuria, frequency, hematuria, menstrual problem and pelvic pain  Musculoskeletal: Negative for arthralgias and back pain  Neurological: Negative for dizziness, seizures and headaches  Psychiatric/Behavioral: The patient is not nervous/anxious        Objective      Ht 5' 1" (1 549 m)   Wt 55 8 kg (123 lb)   LMP 10/02/2022 (Exact Date)   BMI 23 24 kg/m²   Vitals:    10/17/22 0900   Weight: 55 8 kg (123 lb)   Height: 5' 1" (1 549 m)       General:   alert and oriented, in no acute distress   Heart: regular rate and rhythm, S1, S2 normal, no murmur, click, rub or gallop   Lungs: clear to auscultation bilaterally   Abdomen: soft, non-tender, without masses or organomegaly   Vulva: normal   Vagina: normal mucosa   Cervix: no bleeding following Pap, no cervical motion tenderness and no lesions   Uterus: normal size, mobile, non-tender   Adnexa: normal adnexa and no mass, fullness, tenderness   Breast inspection negative, no nipple discharge or bleeding, no masses or nodularity palpable  Rectal deferred, planning colonoscopy  Thyroid normal no masses or nodules     Assessment   46-year-old  here for new patient annual exam   Patient using condoms for contraception satisfied, last Pap about 12 years ago had normal mammogram 2022     Plan   Thin prep with Co testing performed, given slip for next year mammogram, return in 1 year or sooner as needed planning colonoscopy

## 2022-10-21 LAB
HPV HR 12 DNA CVX QL NAA+PROBE: NEGATIVE
HPV16 DNA CVX QL NAA+PROBE: NEGATIVE
HPV18 DNA CVX QL NAA+PROBE: NEGATIVE

## 2022-10-26 LAB
LAB AP GYN PRIMARY INTERPRETATION: NORMAL
LAB AP LMP: NORMAL
Lab: NORMAL

## 2023-05-22 ENCOUNTER — TELEPHONE (OUTPATIENT)
Dept: FAMILY MEDICINE CLINIC | Facility: CLINIC | Age: 48
End: 2023-05-22

## 2023-05-22 NOTE — TELEPHONE ENCOUNTER
Sary Mayfield has upcoming apt with DR TAVERAS   Can we please order routine bloodwork for patient and put in chart    Thank You

## 2023-05-23 DIAGNOSIS — Z13.1 SCREENING FOR DIABETES MELLITUS (DM): ICD-10-CM

## 2023-05-23 DIAGNOSIS — Z13.89 SCREENING FOR CARDIOVASCULAR, RESPIRATORY, AND GENITOURINARY DISEASES: Primary | ICD-10-CM

## 2023-05-23 DIAGNOSIS — Z13.6 SCREENING FOR CARDIOVASCULAR, RESPIRATORY, AND GENITOURINARY DISEASES: Primary | ICD-10-CM

## 2023-05-23 DIAGNOSIS — Z13.83 SCREENING FOR CARDIOVASCULAR, RESPIRATORY, AND GENITOURINARY DISEASES: Primary | ICD-10-CM

## 2023-06-23 ENCOUNTER — TRANSCRIBE ORDERS (OUTPATIENT)
Dept: LAB | Facility: CLINIC | Age: 48
End: 2023-06-23

## 2023-06-23 ENCOUNTER — APPOINTMENT (OUTPATIENT)
Dept: LAB | Facility: CLINIC | Age: 48
End: 2023-06-23
Payer: COMMERCIAL

## 2023-06-23 DIAGNOSIS — Z13.1 SCREENING FOR DIABETES MELLITUS (DM): ICD-10-CM

## 2023-06-23 DIAGNOSIS — Z13.83 SCREENING FOR CARDIOVASCULAR, RESPIRATORY, AND GENITOURINARY DISEASES: ICD-10-CM

## 2023-06-23 DIAGNOSIS — Z13.6 SCREENING FOR CARDIOVASCULAR, RESPIRATORY, AND GENITOURINARY DISEASES: ICD-10-CM

## 2023-06-23 DIAGNOSIS — Z00.8 HEALTH EXAMINATION IN POPULATION SURVEY: Primary | ICD-10-CM

## 2023-06-23 DIAGNOSIS — Z13.89 SCREENING FOR CARDIOVASCULAR, RESPIRATORY, AND GENITOURINARY DISEASES: ICD-10-CM

## 2023-06-23 LAB
ALBUMIN SERPL BCP-MCNC: 3.7 G/DL (ref 3.5–5)
ALP SERPL-CCNC: 53 U/L (ref 46–116)
ALT SERPL W P-5'-P-CCNC: 18 U/L (ref 12–78)
ANION GAP SERPL CALCULATED.3IONS-SCNC: 2 MMOL/L
AST SERPL W P-5'-P-CCNC: 9 U/L (ref 5–45)
BASOPHILS # BLD AUTO: 0.11 THOUSANDS/ÂΜL (ref 0–0.1)
BASOPHILS NFR BLD AUTO: 2 % (ref 0–1)
BILIRUB SERPL-MCNC: 0.55 MG/DL (ref 0.2–1)
BUN SERPL-MCNC: 13 MG/DL (ref 5–25)
CALCIUM SERPL-MCNC: 8.7 MG/DL (ref 8.3–10.1)
CHLORIDE SERPL-SCNC: 110 MMOL/L (ref 96–108)
CHOLEST SERPL-MCNC: 211 MG/DL
CO2 SERPL-SCNC: 29 MMOL/L (ref 21–32)
CREAT SERPL-MCNC: 0.66 MG/DL (ref 0.6–1.3)
EOSINOPHIL # BLD AUTO: 0.11 THOUSAND/ÂΜL (ref 0–0.61)
EOSINOPHIL NFR BLD AUTO: 2 % (ref 0–6)
ERYTHROCYTE [DISTWIDTH] IN BLOOD BY AUTOMATED COUNT: 11.5 % (ref 11.6–15.1)
EST. AVERAGE GLUCOSE BLD GHB EST-MCNC: 105 MG/DL
GFR SERPL CREATININE-BSD FRML MDRD: 106 ML/MIN/1.73SQ M
GLUCOSE P FAST SERPL-MCNC: 95 MG/DL (ref 65–99)
HBA1C MFR BLD: 5.3 %
HCT VFR BLD AUTO: 38.8 % (ref 34.8–46.1)
HDLC SERPL-MCNC: 63 MG/DL
HGB BLD-MCNC: 12.8 G/DL (ref 11.5–15.4)
IMM GRANULOCYTES # BLD AUTO: 0.01 THOUSAND/UL (ref 0–0.2)
IMM GRANULOCYTES NFR BLD AUTO: 0 % (ref 0–2)
LDLC SERPL CALC-MCNC: 132 MG/DL (ref 0–100)
LYMPHOCYTES # BLD AUTO: 1.7 THOUSANDS/ÂΜL (ref 0.6–4.47)
LYMPHOCYTES NFR BLD AUTO: 29 % (ref 14–44)
MCH RBC QN AUTO: 31.4 PG (ref 26.8–34.3)
MCHC RBC AUTO-ENTMCNC: 33 G/DL (ref 31.4–37.4)
MCV RBC AUTO: 95 FL (ref 82–98)
MONOCYTES # BLD AUTO: 0.41 THOUSAND/ÂΜL (ref 0.17–1.22)
MONOCYTES NFR BLD AUTO: 7 % (ref 4–12)
NEUTROPHILS # BLD AUTO: 3.54 THOUSANDS/ÂΜL (ref 1.85–7.62)
NEUTS SEG NFR BLD AUTO: 60 % (ref 43–75)
NONHDLC SERPL-MCNC: 148 MG/DL
NRBC BLD AUTO-RTO: 0 /100 WBCS
PLATELET # BLD AUTO: 475 THOUSANDS/UL (ref 149–390)
PMV BLD AUTO: 9.4 FL (ref 8.9–12.7)
POTASSIUM SERPL-SCNC: 3.7 MMOL/L (ref 3.5–5.3)
PROT SERPL-MCNC: 7.2 G/DL (ref 6.4–8.4)
RBC # BLD AUTO: 4.07 MILLION/UL (ref 3.81–5.12)
SODIUM SERPL-SCNC: 141 MMOL/L (ref 135–147)
TRIGL SERPL-MCNC: 79 MG/DL
WBC # BLD AUTO: 5.88 THOUSAND/UL (ref 4.31–10.16)

## 2023-06-23 PROCEDURE — 36415 COLL VENOUS BLD VENIPUNCTURE: CPT

## 2023-06-23 PROCEDURE — 85025 COMPLETE CBC W/AUTO DIFF WBC: CPT

## 2023-06-23 PROCEDURE — 80053 COMPREHEN METABOLIC PANEL: CPT

## 2023-06-23 PROCEDURE — 80061 LIPID PANEL: CPT

## 2023-06-23 PROCEDURE — 83036 HEMOGLOBIN GLYCOSYLATED A1C: CPT

## 2023-08-01 ENCOUNTER — OFFICE VISIT (OUTPATIENT)
Dept: FAMILY MEDICINE CLINIC | Facility: CLINIC | Age: 48
End: 2023-08-01
Payer: COMMERCIAL

## 2023-08-01 VITALS
DIASTOLIC BLOOD PRESSURE: 62 MMHG | TEMPERATURE: 98 F | SYSTOLIC BLOOD PRESSURE: 98 MMHG | HEIGHT: 61 IN | BODY MASS INDEX: 22.47 KG/M2 | OXYGEN SATURATION: 97 % | HEART RATE: 82 BPM | WEIGHT: 119 LBS | RESPIRATION RATE: 16 BRPM

## 2023-08-01 DIAGNOSIS — Z12.31 BREAST CANCER SCREENING BY MAMMOGRAM: ICD-10-CM

## 2023-08-01 DIAGNOSIS — L30.8 OTHER ECZEMA: ICD-10-CM

## 2023-08-01 DIAGNOSIS — R79.89 ELEVATED PLATELET COUNT: ICD-10-CM

## 2023-08-01 DIAGNOSIS — Z00.00 HEALTHCARE MAINTENANCE: Primary | ICD-10-CM

## 2023-08-01 DIAGNOSIS — Z23 IMMUNIZATION DUE: ICD-10-CM

## 2023-08-01 DIAGNOSIS — Z12.11 COLON CANCER SCREENING: ICD-10-CM

## 2023-08-01 PROCEDURE — 90471 IMMUNIZATION ADMIN: CPT

## 2023-08-01 PROCEDURE — 99396 PREV VISIT EST AGE 40-64: CPT | Performed by: FAMILY MEDICINE

## 2023-08-01 PROCEDURE — 90715 TDAP VACCINE 7 YRS/> IM: CPT

## 2023-08-01 RX ORDER — CLOBETASOL PROPIONATE 0.5 MG/G
CREAM TOPICAL 2 TIMES DAILY
Qty: 180 G | Refills: 3 | Status: SHIPPED | OUTPATIENT
Start: 2023-08-01

## 2023-08-01 NOTE — PROGRESS NOTES
Assessment/Plan:    No problem-specific Assessment & Plan notes found for this encounter. cpe  Chronic elevation platelets, hematology eval  clobex for winter eczema hands, short term use     Diagnoses and all orders for this visit:    Healthcare maintenance    Breast cancer screening by mammogram    Colon cancer screening  -     Ambulatory referral to Gastroenterology; Future    Elevated platelet count  -     Ambulatory referral to Hematology / Oncology; Future    Immunization due  -     TDAP VACCINE GREATER THAN OR EQUAL TO 8YO IM    Other eczema  -     clobetasol (TEMOVATE) 0.05 % cream; Apply topically 2 (two) times a day Short term, hands        Return if symptoms worsen or fail to improve. Subjective:      Patient ID: Trudy Yates is a 57 y.o. female. Chief Complaint   Patient presents with   • Physical Exam     .me       HPI  Was on vacation  Eating ok, not a lot  Not low fat  Limits portions  Running now, 3x/w  1L/d  Has G6PD deficiency by hx  Plt elevated    The following portions of the patient's history were reviewed and updated as appropriate: allergies, current medications, past family history, past medical history, past social history, past surgical history and problem list.    Review of Systems   Constitutional: Negative for chills and fever. Current Outpatient Medications   Medication Sig Dispense Refill   • Calcium Carb-Cholecalciferol (Calcium 1000 + D) 1000-800 MG-UNIT TABS      • Cholecalciferol (VITAMIN D) 2000 units CAPS Take by mouth daily     • clobetasol (TEMOVATE) 0.05 % cream Apply topically 2 (two) times a day Short term, hands 180 g 3   • Multiple Vitamin (MULTI-VITAMIN DAILY) TABS Take by mouth       No current facility-administered medications for this visit.        Objective:    BP 98/62   Pulse 82   Temp 98 °F (36.7 °C)   Resp 16   Ht 5' 1" (1.549 m)   Wt 54 kg (119 lb)   LMP 07/12/2023   SpO2 97%   BMI 22.48 kg/m²        Physical Exam  Vitals and nursing note reviewed. Constitutional:       General: She is not in acute distress. Appearance: She is well-developed. She is not ill-appearing. HENT:      Head: Normocephalic. Right Ear: Tympanic membrane and external ear normal.      Left Ear: Tympanic membrane and external ear normal.      Nose: No congestion. Eyes:      General: No scleral icterus. Conjunctiva/sclera: Conjunctivae normal.   Neck:      Vascular: No carotid bruit. Cardiovascular:      Rate and Rhythm: Normal rate and regular rhythm. Heart sounds: No murmur heard. Pulmonary:      Effort: Pulmonary effort is normal. No respiratory distress. Breath sounds: No wheezing. Abdominal:      General: There is no distension. Palpations: Abdomen is soft. Tenderness: There is no abdominal tenderness. Musculoskeletal:         General: No deformity. Cervical back: Neck supple. Right lower leg: No edema. Left lower leg: No edema. Skin:     General: Skin is warm and dry. Coloration: Skin is not pale. Neurological:      Mental Status: She is alert. Motor: No weakness. Gait: Gait normal.   Psychiatric:         Mood and Affect: Mood normal.         Behavior: Behavior normal.         Thought Content:  Thought content normal.                Sami Blakely DO

## 2023-08-02 ENCOUNTER — TELEPHONE (OUTPATIENT)
Dept: FAMILY MEDICINE CLINIC | Facility: CLINIC | Age: 48
End: 2023-08-02

## 2023-08-02 NOTE — TELEPHONE ENCOUNTER
Submitted a prior auth for clobetasol (TEMOVATE) 0.05 % cream. Waiting for a response.     Key: [id number]  Rx#: [id number]  PA Case ID: [id number]    Susu Horan LPN

## 2023-08-04 ENCOUNTER — TELEPHONE (OUTPATIENT)
Dept: HEMATOLOGY ONCOLOGY | Facility: CLINIC | Age: 48
End: 2023-08-04

## 2023-08-04 NOTE — TELEPHONE ENCOUNTER
I called Tierney Avilez in response to a referral that was received for patient to establish care with Hematology. Outreach was made to schedule a consultation. .    I left a voicemail explaining the reason for my call and advised patient to call [facility] at [phone]. Another attempt will be made to contact patient.

## 2023-08-07 ENCOUNTER — OFFICE VISIT (OUTPATIENT)
Dept: FAMILY MEDICINE CLINIC | Facility: CLINIC | Age: 48
End: 2023-08-07
Payer: COMMERCIAL

## 2023-08-07 VITALS
HEART RATE: 84 BPM | BODY MASS INDEX: 22.73 KG/M2 | DIASTOLIC BLOOD PRESSURE: 62 MMHG | WEIGHT: 120.4 LBS | HEIGHT: 61 IN | TEMPERATURE: 97.4 F | RESPIRATION RATE: 16 BRPM | SYSTOLIC BLOOD PRESSURE: 130 MMHG

## 2023-08-07 DIAGNOSIS — H10.33 ACUTE BACTERIAL CONJUNCTIVITIS OF BOTH EYES: Primary | ICD-10-CM

## 2023-08-07 PROCEDURE — 99213 OFFICE O/P EST LOW 20 MIN: CPT | Performed by: FAMILY MEDICINE

## 2023-08-07 RX ORDER — MOXIFLOXACIN 5 MG/ML
1 SOLUTION/ DROPS OPHTHALMIC 3 TIMES DAILY
Qty: 3 ML | Refills: 0 | Status: SHIPPED | OUTPATIENT
Start: 2023-08-07

## 2023-08-07 NOTE — PROGRESS NOTES
Assessment/Plan:    No problem-specific Assessment & Plan notes found for this encounter. Diagnoses and all orders for this visit:    Acute bacterial conjunctivitis of both eyes  -     moxifloxacin (VIGAMOX) 0.5 % ophthalmic solution; Administer 1 drop to both eyes 3 (three) times a day        Warm compresses  Wash hands often   F/u if no better    Return if symptoms worsen or fail to improve. Subjective:      Patient ID: Fam Love is a 57 y.o. female. Chief Complaint   Patient presents with   • Conjunctivitis     Both eyes, onset saturday  HK CMA        HPI  3d  B/l redness  Visine used  Abrupt onset  Discomfort   Gritty sensation  Am dc, yellow and thick  No cough  Son has something last week  Has G6PD deficiency    No contact lenses use or fb exposures    The following portions of the patient's history were reviewed and updated as appropriate: allergies, current medications, past family history, past medical history, past social history, past surgical history and problem list.    Review of Systems   Constitutional: Negative for chills and fever. Current Outpatient Medications   Medication Sig Dispense Refill   • Calcium Carb-Cholecalciferol (Calcium 1000 + D) 1000-800 MG-UNIT TABS      • Cholecalciferol (VITAMIN D) 2000 units CAPS Take by mouth daily     • clobetasol (TEMOVATE) 0.05 % cream Apply topically 2 (two) times a day Short term, hands 180 g 3   • moxifloxacin (VIGAMOX) 0.5 % ophthalmic solution Administer 1 drop to both eyes 3 (three) times a day 3 mL 0   • Multiple Vitamin (MULTI-VITAMIN DAILY) TABS Take by mouth       No current facility-administered medications for this visit. Objective:    /62   Pulse 84   Temp (!) 97.4 °F (36.3 °C)   Resp 16   Ht 5' 1" (1.549 m)   Wt 54.6 kg (120 lb 6.4 oz)   LMP 08/05/2023 (Exact Date)   BMI 22.75 kg/m²        Physical Exam  Vitals and nursing note reviewed.    Constitutional:       General: She is not in acute distress. Appearance: She is well-developed. She is not ill-appearing. HENT:      Head: Normocephalic. Right Ear: External ear normal.      Left Ear: External ear normal.      Nose: No congestion. Eyes:      General:         Right eye: No discharge. Left eye: No discharge. Conjunctiva/sclera: Conjunctivae normal.      Comments: B/l hyperemia   Cardiovascular:      Rate and Rhythm: Normal rate and regular rhythm. Pulmonary:      Effort: Pulmonary effort is normal. No respiratory distress. Breath sounds: No wheezing or rales. Abdominal:      Palpations: Abdomen is soft. Musculoskeletal:         General: No deformity. Cervical back: Neck supple. Skin:     General: Skin is warm and dry. Coloration: Skin is not pale. Neurological:      Mental Status: She is alert. Motor: No weakness. Gait: Gait normal.   Psychiatric:         Behavior: Behavior normal.         Thought Content:  Thought content normal.                Migdalia Salas DO

## 2023-08-09 ENCOUNTER — TELEPHONE (OUTPATIENT)
Dept: HEMATOLOGY ONCOLOGY | Facility: CLINIC | Age: 48
End: 2023-08-09

## 2023-08-09 NOTE — TELEPHONE ENCOUNTER
I called Makenzie Anguiano in response to a referral that was received for patient to establish care with Hematology.      Outreach was made to schedule a consultation. .     I left a voicemail explaining the reason for my call and advised patient to call \[facility]\"" at [phone].   Another attempt will be made to contact patient.

## 2023-08-10 ENCOUNTER — TELEPHONE (OUTPATIENT)
Dept: HEMATOLOGY ONCOLOGY | Facility: CLINIC | Age: 48
End: 2023-08-10

## 2023-08-10 NOTE — TELEPHONE ENCOUNTER
I called Nelly Bauer in response to a referral that was received for patient to establish care with Hematology. Outreach was made to schedule a consultation. I left a voicemail explaining the reason for my call and advised patient to call [facility] at [phone]. This is the third attempt to schedule patient unsuccessfully. The referral has been closed, a [employer] message has been sent to patient (if applicable) and a letter has been sent to the address on file.

## 2023-09-30 PROBLEM — Z12.11 COLON CANCER SCREENING: Status: RESOLVED | Noted: 2023-08-01 | Resolved: 2023-09-30

## 2023-10-06 PROBLEM — H10.33 ACUTE BACTERIAL CONJUNCTIVITIS OF BOTH EYES: Status: RESOLVED | Noted: 2023-08-07 | Resolved: 2023-10-06

## 2023-11-20 ENCOUNTER — TELEPHONE (OUTPATIENT)
Age: 48
End: 2023-11-20

## 2023-11-20 DIAGNOSIS — J31.0 CHRONIC RHINITIS: Primary | ICD-10-CM

## 2023-11-20 RX ORDER — FLUTICASONE PROPIONATE 50 MCG
2 SPRAY, SUSPENSION (ML) NASAL DAILY
Qty: 48 G | Refills: 1 | Status: SHIPPED | OUTPATIENT
Start: 2023-11-20

## 2023-11-20 NOTE — TELEPHONE ENCOUNTER
Flonase - Patient is asking if a script for flonase can be sent into the pharmacy. Not active on med list please advise.

## 2024-02-21 ENCOUNTER — HOSPITAL ENCOUNTER (OUTPATIENT)
Dept: RADIOLOGY | Facility: HOSPITAL | Age: 49
Discharge: HOME | End: 2024-02-21
Payer: COMMERCIAL

## 2024-02-21 VITALS — WEIGHT: 120 LBS | BODY MASS INDEX: 22.08 KG/M2 | HEIGHT: 62 IN

## 2024-02-21 DIAGNOSIS — Z12.31 ENCOUNTER FOR SCREENING MAMMOGRAM FOR MALIGNANT NEOPLASM OF BREAST: ICD-10-CM

## 2024-02-21 PROBLEM — Z00.00 HEALTHCARE MAINTENANCE: Status: RESOLVED | Noted: 2018-08-28 | Resolved: 2024-02-21

## 2024-02-21 PROBLEM — Z13.83 SCREENING FOR CARDIOVASCULAR, RESPIRATORY, AND GENITOURINARY DISEASES: Status: RESOLVED | Noted: 2018-06-24 | Resolved: 2024-02-21

## 2024-02-21 PROBLEM — Z13.6 SCREENING FOR CARDIOVASCULAR, RESPIRATORY, AND GENITOURINARY DISEASES: Status: RESOLVED | Noted: 2018-06-24 | Resolved: 2024-02-21

## 2024-02-21 PROBLEM — Z13.89 SCREENING FOR CARDIOVASCULAR, RESPIRATORY, AND GENITOURINARY DISEASES: Status: RESOLVED | Noted: 2018-06-24 | Resolved: 2024-02-21

## 2024-02-21 PROBLEM — Z13.1 SCREENING FOR DIABETES MELLITUS (DM): Status: RESOLVED | Noted: 2018-06-24 | Resolved: 2024-02-21

## 2024-02-21 PROCEDURE — 77063 BREAST TOMOSYNTHESIS BI: CPT

## 2024-02-21 PROCEDURE — 77067 SCR MAMMO BI INCL CAD: CPT

## 2024-04-04 ENCOUNTER — ANNUAL EXAM (OUTPATIENT)
Dept: OBGYN CLINIC | Facility: CLINIC | Age: 49
End: 2024-04-04
Payer: COMMERCIAL

## 2024-04-04 VITALS
SYSTOLIC BLOOD PRESSURE: 118 MMHG | HEIGHT: 62 IN | DIASTOLIC BLOOD PRESSURE: 80 MMHG | WEIGHT: 122 LBS | BODY MASS INDEX: 22.45 KG/M2

## 2024-04-04 DIAGNOSIS — Z12.31 ENCOUNTER FOR SCREENING MAMMOGRAM FOR MALIGNANT NEOPLASM OF BREAST: ICD-10-CM

## 2024-04-04 DIAGNOSIS — Z01.419 WELL WOMAN EXAM WITH ROUTINE GYNECOLOGICAL EXAM: Primary | ICD-10-CM

## 2024-04-04 DIAGNOSIS — N85.2 BULKY OR ENLARGED UTERUS: ICD-10-CM

## 2024-04-04 PROCEDURE — S0612 ANNUAL GYNECOLOGICAL EXAMINA: HCPCS | Performed by: STUDENT IN AN ORGANIZED HEALTH CARE EDUCATION/TRAINING PROGRAM

## 2024-04-04 NOTE — PATIENT INSTRUCTIONS
Kegel Exercises for Women   AMBULATORY CARE:   Kegel exercises  help strengthen your pelvic muscles. Pelvic muscles hold your pelvic organs, such as your bladder and uterus, in place. Kegel exercises help prevent or control certain conditions, such as urine incontinence (leakage) or uterine prolapse.       Call your doctor or physical therapist if:   You cannot feel your muscles tighten or relax.    You continue to leak urine.    You have questions or concerns about your condition or care.    Use the correct muscles:  Pelvic muscles are the muscles you use to control urine flow. To target these muscles, stop and start the flow of urine several times. This will help you become familiar with how it feels to tighten and relax these muscles.  How to do Kegel exercises:   Get into a comfortable position.  You may lie down, stand up, or sit down to do these exercises. When you first try to do these exercises, it may be easier if you lie down.    Tighten or squeeze your pelvic muscles slowly.  It may feel like you are trying to hold back urine or gas. Hold this position for 3 seconds. Relax for 3 seconds. Repeat this cycle 10 times. Do not hold your breath when you do Kegel exercises. Keep your stomach, back, and leg muscles relaxed.    Do 10 sets of Kegel exercises, at least 3 times a day.  When you know how to do Kegel exercises, use different positions. This will help to strengthen your pelvic muscles as much as possible. You can do these exercises while you lie on the floor, watch TV, or while you stand. Tighten your pelvic muscles before you sneeze, cough, or lift to prevent urine leakage. You may notice improved bladder control within about 6 weeks.    Follow up with your doctor or physical therapist as directed:  Write down your questions so you remember to ask them during your visits.  © Copyright [employer] 2023 Information is for End User's use only and may not be sold, redistributed or otherwise used for  commercial purposes.  The above information is an  only. It is not intended as medical advice for individual conditions or treatments. Talk to your doctor, nurse or pharmacist before following any medical regimen to see if it is safe and effective for you.
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
## 2024-06-03 ENCOUNTER — TELEPHONE (OUTPATIENT)
Age: 49
End: 2024-06-03

## 2024-06-03 NOTE — TELEPHONE ENCOUNTER
[facility] COLON     Screened by: Bonnie Christian MA    Referring Provider pcp    Pre- Screening:     There is no height or weight on file to calculate BMI.  Has patient been referred for a routine screening Colonoscopy? yes  Is the patient between 45-75 years old? yes      Previous Colonoscopy no   If yes:    Date:     Facility:     Reason:       Does the patient want to see a Gastroenterologist prior to their procedure OR are they having any GI symptoms? no    Has the patient been hospitalized or had abdominal surgery in the past 6 months? no    Does the patient use supplemental oxygen? no    Does the patient take Coumadin, Lovenox, Plavix, Elliquis, Xarelto, or other blood thinning medication? no    Has the patient had a stroke, cardiac event, or stent placed in the past year? no    Colonoscopy scheduled    If patient is between 45yrs - 49yrs, please advise patient that we will have to confirm benefits & coverage with their insurance company for a routine screening colonoscopy.      ASC Screening    ASC Screening  BMI > than 45: No  Are you currently pregnant?: No  Do you rely on a wheelchair for mobility?: No  Do you need oxygen during the day?: No  Have you ever been informed by anesthesia that you have a difficult airway?: No  Have you been diagnosed with End Stage Renal Disease (ESRD)?: No  Are you actively on dialysis?: No  Have you been diagnosed with Pulmonary Hypertension?: No  Do you have a pacemaker or an Automatic Implantable Cardioverter Defibrillator (AICD)?: No  Have you ever had an organ transplant?: No  Have you had a stroke, heart attack, myocardial infarction (MI) within the last 6 months?: No  Have you ever been diagnosed with Aortic Stenosis?: No  Have you ever been diagnosed  with Congestive Heart Failure?: No  Have you ever been diagnosed with a heart valve disease?: No  Are you Diabetic?: No  If you are Diabetic, has your A1C been greater than 12 within the last six months?: N/A         Navdeep  Marti LINDER MRN: [id number]    Date: 9/12/2024 Status: [facility]   Time: 9:00 AM  9:00 AM Length: 30  30   Visit Type: GI COLONOSCOPY [1102] Copay: $0.00   Provider: Aydee Heath MD  [facility] GI ROOM 01       Bowel prep reviewed with patient:marialax   Instructions reviewed with patient by:leigh ann vasquez  Clearances: none

## 2024-06-06 ENCOUNTER — TELEPHONE (OUTPATIENT)
Age: 49
End: 2024-06-06

## 2024-06-06 ENCOUNTER — TELEPHONE (OUTPATIENT)
Dept: OBGYN CLINIC | Facility: CLINIC | Age: 49
End: 2024-06-06

## 2024-06-06 DIAGNOSIS — Z13.1 SCREENING FOR DIABETES MELLITUS (DM): Primary | ICD-10-CM

## 2024-06-06 DIAGNOSIS — Z13.220 SCREENING FOR LIPID DISORDERS: ICD-10-CM

## 2024-06-06 DIAGNOSIS — N85.2 BULKY OR ENLARGED UTERUS: Primary | ICD-10-CM

## 2024-06-06 NOTE — TELEPHONE ENCOUNTER
The patient called she has an appointment on 8/2/24 for a physical   do you want labs done?    Please call the patient when the orders are put in thank you

## 2024-06-06 NOTE — TELEPHONE ENCOUNTER
Patient tried to get appointment set up with central scheduling for radiology but due to the expected date of 10/1 she isn't able to be scheduled. Patient is not pregnant and her diagnosis is for an enlarged uterus. Would this order be able to be updated that way patient can call to be scheduled?    Theo Bassett DO  [address] [place] [id number]  Phone: [phone]  Fax: [phone]        June 16, 2021     Chelo Mejia  [address] [address] [facility] [address]      Dear [facility]:    Our records indicate that you are due for a mammogram.    In the [place], one in nine women will develop breast cancer during their lifetime. While there is no way to prevent breast cancer, early detection provides the best opportunity for curing it. For women over the age of 36, the [address] [place] [address] Ave recommends a yearly clinical breast exam and a yearly mammogram. These practices have saved thousands of lives. We need your help to ensure that you are receiving optimal medical care. Please make an appointment for a mammogram at your earliest convenience.     Sincerely,        Theo Bassett DO

## 2024-06-17 ENCOUNTER — HOSPITAL ENCOUNTER (OUTPATIENT)
Dept: RADIOLOGY | Facility: HOSPITAL | Age: 49
Discharge: HOME | End: 2024-06-17
Attending: STUDENT IN AN ORGANIZED HEALTH CARE EDUCATION/TRAINING PROGRAM
Payer: COMMERCIAL

## 2024-06-17 DIAGNOSIS — N85.2 BULKY OR ENLARGED UTERUS: ICD-10-CM

## 2024-06-17 PROCEDURE — 76856 US EXAM PELVIC COMPLETE: CPT

## 2024-06-17 PROCEDURE — 76830 TRANSVAGINAL US NON-OB: CPT

## 2024-06-20 ENCOUNTER — TELEPHONE (OUTPATIENT)
Dept: OBGYN CLINIC | Facility: CLINIC | Age: 49
End: 2024-06-20

## 2024-06-20 DIAGNOSIS — N83.201 RIGHT OVARIAN CYST: Primary | ICD-10-CM

## 2024-07-28 ENCOUNTER — APPOINTMENT (OUTPATIENT)
Dept: LAB | Facility: HOSPITAL | Age: 49
End: 2024-07-28
Attending: FAMILY MEDICINE
Payer: COMMERCIAL

## 2024-07-28 ENCOUNTER — APPOINTMENT (OUTPATIENT)
Dept: LAB | Facility: HOSPITAL | Age: 49
End: 2024-07-28
Payer: COMMERCIAL

## 2024-07-28 DIAGNOSIS — Z00.8 HEALTH EXAMINATION IN POPULATION SURVEY: ICD-10-CM

## 2024-07-28 DIAGNOSIS — Z13.1 SCREENING FOR DIABETES MELLITUS (DM): ICD-10-CM

## 2024-07-28 DIAGNOSIS — Z13.220 SCREENING FOR LIPID DISORDERS: ICD-10-CM

## 2024-07-28 LAB
ALBUMIN SERPL BCG-MCNC: 4.3 G/DL (ref 3.5–5)
ALP SERPL-CCNC: 41 U/L (ref 34–104)
ALT SERPL W P-5'-P-CCNC: 9 U/L (ref 7–52)
ANION GAP SERPL CALCULATED.3IONS-SCNC: 5 MMOL/L (ref 4–13)
AST SERPL W P-5'-P-CCNC: 12 U/L (ref 13–39)
BILIRUB SERPL-MCNC: 0.68 MG/DL (ref 0.2–1)
BUN SERPL-MCNC: 14 MG/DL (ref 5–25)
CALCIUM SERPL-MCNC: 9.1 MG/DL (ref 8.4–10.2)
CHLORIDE SERPL-SCNC: 103 MMOL/L (ref 96–108)
CHOLEST SERPL-MCNC: 209 MG/DL
CO2 SERPL-SCNC: 29 MMOL/L (ref 21–32)
CREAT SERPL-MCNC: 0.75 MG/DL (ref 0.6–1.3)
EST. AVERAGE GLUCOSE BLD GHB EST-MCNC: 100 MG/DL
GFR SERPL CREATININE-BSD FRML MDRD: 95 ML/MIN/1.73SQ M
GLUCOSE P FAST SERPL-MCNC: 101 MG/DL (ref 65–99)
HBA1C MFR BLD: 5.1 %
HDLC SERPL-MCNC: 56 MG/DL
LDLC SERPL CALC-MCNC: 131 MG/DL (ref 0–100)
POTASSIUM SERPL-SCNC: 4.2 MMOL/L (ref 3.5–5.3)
PROT SERPL-MCNC: 7.2 G/DL (ref 6.4–8.4)
SODIUM SERPL-SCNC: 137 MMOL/L (ref 135–147)
TRIGL SERPL-MCNC: 108 MG/DL

## 2024-07-28 PROCEDURE — 36415 COLL VENOUS BLD VENIPUNCTURE: CPT

## 2024-07-28 PROCEDURE — 80061 LIPID PANEL: CPT

## 2024-07-28 PROCEDURE — 80053 COMPREHEN METABOLIC PANEL: CPT

## 2024-07-28 PROCEDURE — 83036 HEMOGLOBIN GLYCOSYLATED A1C: CPT

## 2024-08-02 ENCOUNTER — OFFICE VISIT (OUTPATIENT)
Dept: FAMILY MEDICINE CLINIC | Facility: CLINIC | Age: 49
End: 2024-08-02
Payer: COMMERCIAL

## 2024-08-02 VITALS
HEIGHT: 61 IN | HEART RATE: 89 BPM | TEMPERATURE: 98.2 F | SYSTOLIC BLOOD PRESSURE: 122 MMHG | WEIGHT: 119 LBS | BODY MASS INDEX: 22.47 KG/M2 | DIASTOLIC BLOOD PRESSURE: 86 MMHG | RESPIRATION RATE: 16 BRPM

## 2024-08-02 DIAGNOSIS — Z00.00 HEALTHCARE MAINTENANCE: Primary | ICD-10-CM

## 2024-08-02 DIAGNOSIS — Z91.89 FRAMINGHAM CARDIAC RISK <10% IN NEXT 10 YEARS: ICD-10-CM

## 2024-08-02 PROCEDURE — 99396 PREV VISIT EST AGE 40-64: CPT | Performed by: FAMILY MEDICINE

## 2024-08-02 NOTE — PROGRESS NOTES
"Assessment/Plan:    No problem-specific Assessment & Plan notes found for this encounter.    Cpe  Labs reviewed  Fbs 101, watch carbs  A1c 5.1   but ascvd score 0.9%  Diet/exercise suggested  Ca and D suggested     Diagnoses and all orders for this visit:    Healthcare maintenance    [place] cardiac risk <10% in next 10 years        Return if symptoms worsen or fail to improve.    Subjective:      Patient ID: Marti Sethi is a 49 y.o. female.    Chief Complaint   Patient presents with    Physical Exam     Sas/cma       HPI  Here for cpe  Not much exercise  Carb aware  Can do better with diet    The following portions of the patient's history were reviewed and updated as appropriate: allergies, current medications, past family history, past medical history, past social history, past surgical history and problem list.    Review of Systems   Constitutional:  Negative for fever.   Respiratory:  Negative for shortness of breath.    Gastrointestinal:  Negative for blood in stool.   Genitourinary:  Negative for hematuria.         Current Outpatient Medications   Medication Sig Dispense Refill    Calcium Carb-Cholecalciferol (Calcium 1000 + D) 1000-800 MG-UNIT TABS       Cholecalciferol (VITAMIN D) 2000 units CAPS Take by mouth daily      clobetasol (TEMOVATE) 0.05 % cream Apply topically 2 (two) times a day Short term, hands 180 g 3    Multiple Vitamin (MULTI-VITAMIN DAILY) TABS Take by mouth       No current facility-administered medications for this visit.       Objective:    /86   Pulse 89   Temp 98.2 °F (36.8 °C)   Resp 16   Ht 5' 1\" (1.549 m)   Wt 54 kg (119 lb)   LMP 07/26/2024 (Exact Date)   BMI 22.48 kg/m²        Physical Exam  Vitals and nursing note reviewed.   Constitutional:       General: She is not in acute distress.     Appearance: She is well-developed. She is not ill-appearing.   HENT:      Head: Normocephalic.      Right Ear: Tympanic membrane normal.      Left Ear: Tympanic " membrane normal.      Mouth/Throat:      Mouth: Mucous membranes are moist.   Eyes:      General: No scleral icterus.     Conjunctiva/sclera: Conjunctivae normal.   Neck:      Vascular: No carotid bruit.   Cardiovascular:      Rate and Rhythm: Normal rate and regular rhythm.      Heart sounds: No murmur heard.  Pulmonary:      Effort: Pulmonary effort is normal. No respiratory distress.      Breath sounds: No wheezing.   Abdominal:      General: There is no distension.      Palpations: Abdomen is soft. There is no mass.      Tenderness: There is no abdominal tenderness. There is no rebound.   Musculoskeletal:         General: No deformity.      Cervical back: Neck supple.      Right lower leg: No edema.      Left lower leg: No edema.   Skin:     General: Skin is warm and dry.      Coloration: Skin is not jaundiced or pale.   Neurological:      Mental Status: She is alert.      Motor: No weakness.      Gait: Gait normal.   Psychiatric:         Mood and Affect: Mood normal.         Behavior: Behavior normal.         Thought Content: Thought content normal.                Rogelio Flores DO

## 2024-08-20 ENCOUNTER — HOSPITAL ENCOUNTER (OUTPATIENT)
Dept: RADIOLOGY | Facility: HOSPITAL | Age: 49
Discharge: HOME | End: 2024-08-20
Attending: STUDENT IN AN ORGANIZED HEALTH CARE EDUCATION/TRAINING PROGRAM
Payer: COMMERCIAL

## 2024-08-20 DIAGNOSIS — N83.201 RIGHT OVARIAN CYST: ICD-10-CM

## 2024-08-20 PROCEDURE — 76830 TRANSVAGINAL US NON-OB: CPT

## 2024-08-20 PROCEDURE — 76856 US EXAM PELVIC COMPLETE: CPT

## 2024-08-29 ENCOUNTER — TELEPHONE (OUTPATIENT)
Age: 49
End: 2024-08-29

## 2024-08-29 NOTE — TELEPHONE ENCOUNTER
Patient returned call regarding pelvic US results & Dr Shaikh' recommendation for follow-up/ surgical consult. Pt declined 3:00 appt today. No other appt availability. Attempted warm transfer. Please reach out to pt to schedule.

## 2024-09-01 PROBLEM — Z00.00 HEALTHCARE MAINTENANCE: Status: RESOLVED | Noted: 2024-08-02 | Resolved: 2024-09-01

## 2024-09-04 NOTE — TELEPHONE ENCOUNTER
Called pt to see if she can come in today with Dr. Shaikh at 2pm as she had a cancellation.  States she can not make that appointment as she has work.  Advised pt will keep checking the schedule for an opening.

## 2024-09-12 ENCOUNTER — ANESTHESIA EVENT (OUTPATIENT)
Dept: GASTROENTEROLOGY | Facility: HOSPITAL | Age: 49
End: 2024-09-12
Payer: COMMERCIAL

## 2024-09-12 ENCOUNTER — ANESTHESIA (OUTPATIENT)
Dept: GASTROENTEROLOGY | Facility: HOSPITAL | Age: 49
End: 2024-09-12
Payer: COMMERCIAL

## 2024-09-12 ENCOUNTER — HOSPITAL ENCOUNTER (OUTPATIENT)
Dept: GASTROENTEROLOGY | Facility: HOSPITAL | Age: 49
Setting detail: OUTPATIENT SURGERY
End: 2024-09-12
Attending: INTERNAL MEDICINE
Payer: COMMERCIAL

## 2024-09-12 VITALS
HEART RATE: 78 BPM | OXYGEN SATURATION: 100 % | WEIGHT: 119 LBS | TEMPERATURE: 97.3 F | HEIGHT: 62 IN | RESPIRATION RATE: 18 BRPM | BODY MASS INDEX: 21.9 KG/M2 | DIASTOLIC BLOOD PRESSURE: 63 MMHG | SYSTOLIC BLOOD PRESSURE: 116 MMHG

## 2024-09-12 DIAGNOSIS — Z12.11 SCREEN FOR COLON CANCER: ICD-10-CM

## 2024-09-12 LAB
EXT PREGNANCY TEST URINE: NEGATIVE
EXT. CONTROL: NORMAL

## 2024-09-12 PROCEDURE — 88305 TISSUE EXAM BY PATHOLOGIST: CPT | Performed by: PATHOLOGY

## 2024-09-12 PROCEDURE — 45385 COLONOSCOPY W/LESION REMOVAL: CPT | Performed by: INTERNAL MEDICINE

## 2024-09-12 PROCEDURE — 81025 URINE PREGNANCY TEST: CPT | Performed by: ANESTHESIOLOGY

## 2024-09-12 RX ORDER — PROPOFOL 10 MG/ML
INJECTION, EMULSION INTRAVENOUS AS NEEDED
Status: DISCONTINUED | OUTPATIENT
Start: 2024-09-12 | End: 2024-09-12

## 2024-09-12 RX ORDER — SODIUM CHLORIDE, SODIUM LACTATE, POTASSIUM CHLORIDE, CALCIUM CHLORIDE 600; 310; 30; 20 MG/100ML; MG/100ML; MG/100ML; MG/100ML
INJECTION, SOLUTION INTRAVENOUS CONTINUOUS PRN
Status: DISCONTINUED | OUTPATIENT
Start: 2024-09-12 | End: 2024-09-12

## 2024-09-12 RX ORDER — LIDOCAINE HYDROCHLORIDE 10 MG/ML
INJECTION, SOLUTION EPIDURAL; INFILTRATION; INTRACAUDAL; PERINEURAL AS NEEDED
Status: DISCONTINUED | OUTPATIENT
Start: 2024-09-12 | End: 2024-09-12

## 2024-09-12 RX ADMIN — PROPOFOL 25 MG: 10 INJECTION, EMULSION INTRAVENOUS at 12:27

## 2024-09-12 RX ADMIN — LIDOCAINE HYDROCHLORIDE 50 MG: 10 INJECTION, SOLUTION EPIDURAL; INFILTRATION; INTRACAUDAL at 12:22

## 2024-09-12 RX ADMIN — Medication 40 MG: at 12:24

## 2024-09-12 RX ADMIN — PROPOFOL 25 MG: 10 INJECTION, EMULSION INTRAVENOUS at 12:33

## 2024-09-12 RX ADMIN — PROPOFOL 25 MG: 10 INJECTION, EMULSION INTRAVENOUS at 12:35

## 2024-09-12 RX ADMIN — PROPOFOL 25 MG: 10 INJECTION, EMULSION INTRAVENOUS at 12:29

## 2024-09-12 RX ADMIN — PROPOFOL 25 MG: 10 INJECTION, EMULSION INTRAVENOUS at 12:31

## 2024-09-12 RX ADMIN — PROPOFOL 25 MG: 10 INJECTION, EMULSION INTRAVENOUS at 12:39

## 2024-09-12 RX ADMIN — PROPOFOL 100 MG: 10 INJECTION, EMULSION INTRAVENOUS at 12:22

## 2024-09-12 RX ADMIN — PROPOFOL 25 MG: 10 INJECTION, EMULSION INTRAVENOUS at 12:25

## 2024-09-12 RX ADMIN — PROPOFOL 25 MG: 10 INJECTION, EMULSION INTRAVENOUS at 12:23

## 2024-09-12 RX ADMIN — SODIUM CHLORIDE, SODIUM LACTATE, POTASSIUM CHLORIDE, AND CALCIUM CHLORIDE: .6; .31; .03; .02 INJECTION, SOLUTION INTRAVENOUS at 12:15

## 2024-09-12 NOTE — ANESTHESIA POSTPROCEDURE EVALUATION
Post-Op Assessment Note    CV Status:  Stable    Pain management: adequate       Mental Status:  Awake and lethargic   Hydration Status:  Stable   PONV Controlled:  None   Airway Patency:  Patent     Post Op Vitals Reviewed: Yes    No anethesia notable event occurred.    Staff: LEYLA             BP 93/64 (09/12/24 1244)    Temp (!) 97.3 °F (36.3 °C) (09/12/24 1244)    Pulse 79 (09/12/24 1244)   Resp 18 (09/12/24 1244)    SpO2 98 % (09/12/24 1244)

## 2024-09-12 NOTE — H&P
"History and Physical -  [facility]  Marti Sethi 49 y.o. female MRN: [id number]    HPI: Marti Sethi is a 49 y.o. year old female who presents for colon cancer screening evaluation.      Review of Systems    Historical Information   Past Medical History:   Diagnosis Date    History of closed fracture 07/24/2002    Navicular bone of foot    Hyperlipidemia 12/17/2013    resolved 5 13 2015     Past Surgical History:   Procedure Laterality Date    WISDOM TOOTH EXTRACTION Bilateral      Social History   Social History     Substance and Sexual Activity   Alcohol Use Yes    Alcohol/week: 2.0 standard drinks of alcohol    Types: 2 Cans of beer per week    Comment: Occasional     Social History     Substance and Sexual Activity   Drug Use Never     Social History     Tobacco Use   Smoking Status Never    Passive exposure: Past   Smokeless Tobacco Never     Family History   Problem Relation Age of Onset    Diabetes Mother     Hypertension Mother     Hypertension Father        Meds/Allergies     Not in a hospital admission.    No Known Allergies    Objective     /72   Pulse 84   Temp 97.6 °F (36.4 °C) (Temporal)   Resp 19   Ht 5' 2\" (1.575 m)   Wt 54 kg (119 lb)   SpO2 100%   BMI 21.77 kg/m²       PHYSICAL EXAM    Gen: NAD  CV: RRR  CHEST: Clear  ABD: soft, NT/ND  EXT: no edema  Neuro: AAO      ASSESSMENT/PLAN:  This is a 49 y.o. year old female here for colon cancer screening evaluation.  Patient was explained about the risks and benefits of the procedure. Risks including but not limited to bleeding, infection, perforation were explained in detail.  PLAN:   Procedure: Colonoscopy.      "

## 2024-09-12 NOTE — ANESTHESIA PREPROCEDURE EVALUATION
Procedure:  COLONOSCOPY    Relevant Problems   No relevant active problems        Physical Exam    Airway    Mallampati score: I  TM Distance: >3 FB  Neck ROM: full     Dental   Comment: Upper partial     Cardiovascular      Pulmonary      Other Findings  post-pubertal.      Anesthesia Plan  ASA Score- 1     Anesthesia Type- IV sedation with anesthesia with ASA Monitors.         Additional Monitors:     Airway Plan:            Plan Factors-Exercise tolerance (METS): >4 METS.    Chart reviewed.    Patient summary reviewed.                  Induction- intravenous.    Postoperative Plan-     Perioperative Resuscitation Plan - Level 1 - Full Code.       Informed Consent- Anesthetic plan and risks discussed with patient.  I personally reviewed this patient with the CRNA. Discussed and agreed on the Anesthesia Plan with the CRNA..

## 2024-09-16 ENCOUNTER — TELEPHONE (OUTPATIENT)
Age: 49
End: 2024-09-16

## 2024-09-16 PROCEDURE — 88305 TISSUE EXAM BY PATHOLOGIST: CPT | Performed by: PATHOLOGY

## 2024-09-18 ENCOUNTER — TELEPHONE (OUTPATIENT)
Dept: OBGYN CLINIC | Facility: CLINIC | Age: 49
End: 2024-09-18

## 2024-09-18 NOTE — TELEPHONE ENCOUNTER
Patient rescheduled to 1/9/25 in [place] location. Availability more than 3 months out per guidelines forwarding to clerical. Patient also placed on waitlist.

## 2024-10-14 ENCOUNTER — CONSULT (OUTPATIENT)
Dept: OBGYN CLINIC | Facility: CLINIC | Age: 49
End: 2024-10-14
Payer: COMMERCIAL

## 2024-10-14 VITALS
DIASTOLIC BLOOD PRESSURE: 72 MMHG | HEIGHT: 62 IN | BODY MASS INDEX: 22.26 KG/M2 | SYSTOLIC BLOOD PRESSURE: 118 MMHG | WEIGHT: 121 LBS

## 2024-10-14 DIAGNOSIS — R19.00 PELVIC MASS: Primary | ICD-10-CM

## 2024-10-14 DIAGNOSIS — Z01.818 PRE-OP TESTING: ICD-10-CM

## 2024-10-14 PROCEDURE — 99214 OFFICE O/P EST MOD 30 MIN: CPT | Performed by: STUDENT IN AN ORGANIZED HEALTH CARE EDUCATION/TRAINING PROGRAM

## 2024-10-14 NOTE — PROGRESS NOTES
Ambulatory Visit  Name: Marti Sethi      : 1975      MRN: [id number]  Encounter Provider: Masha Shaikh MD  Encounter Date: 10/14/2024   Encounter department: [facility] [place]    Assessment & Plan  Pelvic mass  49-year-old G2, P2 with persistent 6.5 cm complex right adnexal mass and new left 5 cm complex mass that is likely a hemorrhagic cyst.  We reviewed the OR RADS 3 grading system of her right adnexal mass that has been persistent and reviewed the low risk of malignancy (1-10%).  She is overall asymptomatic from these pelvic masses and we did review her options for management.  We reviewed expectant management with follow-up interval imaging in addition to surgical management with removal of the mass.  We reviewed risks of ovarian torsion with larger masses that can be painful and the low risk of malignancy based on findings and family history.  Patient is interested in follow-up pelvic imaging and if pelvic mass is persistent she desires removal.  We reviewed laparoscopic right cystectomy versus oophorectomy and left ovarian cystectomy if still present.  We reviewed recommendations for bilateral salpingectomy for the theoretical decrease risk of ovarian cancer.  We discussed in the event that malignancy was present and this would require further surgery with GYN oncology.  Patient with prefer oophorectomy to reduce risks and cystectomy of the right ovarian mass following counseling.  We discussed recommendations for laparoscopic right oophorectomy, possible left cystectomy, bilateral salpingectomy and exam under anesthesia.  We reviewed the risks including bleeding, infection, injury to intra-abdominal structures, need for repeat or future procedures, open procedure, VTE and wound healing complications.  She was given opportunity ask questions which were answered to the best my ability and informed consent was obtained today.    Orders:    US pelvis complete w  "transvaginal; Future    Pre-op testing    Orders:    CBC and Platelet; Future      History of Present Illness     Marti Sethi is a 49 y.o. female who presents for management and surgical discussion for bilateral ovarian masses.  Patient was noted to have pelvic fullness on her annual exam and was subsequently sent for pelvic imaging-in June/2024 she was noted to have a complex right ovarian mass measuring 6.1 x 5.6 x 6.5 with repeat ultrasound completed on 8/20/2024 showing a right adnexal mass measuring 6.4 x 5.6 x 5.8 cm, O RADS 3 and a new left 5.7 complex left ovarian mass likely hemorrhagic cyst or ORADS 2.    Patient denies any pelvic pain, irregular menses or any other GYN complaints.  OB history significant for 2 prior vaginal deliveries.  She has no significant surgical or GYN history.      Review of Systems  Per HPI      Objective     /72 (BP Location: Right arm, Patient Position: Sitting, Cuff Size: Standard)   Ht 5' 2\" (1.575 m)   Wt 54.9 kg (121 lb)   LMP 09/23/2024 (Exact Date)   BMI 22.13 kg/m²     Physical Exam  Vitals reviewed.   Constitutional:       General: She is not in acute distress.     Appearance: Normal appearance. She is not ill-appearing or diaphoretic.   HENT:      Head: Normocephalic and atraumatic.   Cardiovascular:      Rate and Rhythm: Normal rate.   Pulmonary:      Effort: Pulmonary effort is normal. No respiratory distress.   Abdominal:      Palpations: Abdomen is soft.      Tenderness: There is no abdominal tenderness. There is no guarding or rebound.   Musculoskeletal:      Right lower leg: No edema.      Left lower leg: No edema.   Skin:     General: Skin is warm and dry.   Neurological:      Mental Status: She is alert and oriented to person, place, and time.   Psychiatric:         Mood and Affect: Mood normal.         Behavior: Behavior normal.         "

## 2024-10-29 ENCOUNTER — TELEPHONE (OUTPATIENT)
Dept: OBGYN CLINIC | Facility: CLINIC | Age: 49
End: 2024-10-29

## 2024-10-29 NOTE — TELEPHONE ENCOUNTER
----- Message from Masha Shaikh MD sent at 10/14/2024 10:24 AM EDT -----  Regarding: Lap right oophorectomy and BS  [facility] GYN Department  Surgery Scheduling Sheet    Patient Name: Marti Sethi  : 1975    Provider: Masha Shaikh MD     Needed: no; Language: N/A    Procedure: laparoscopic right oophorectomy, left ovarian cystectomy, bilateral salpingectomy, exam under anesthesia    Diagnosis: Bilateral adnexal masses    Special Needs or Equipment: none    Anesthesia: General anesthesia    Length of stay: outpatient  Does patient have comorbid conditions that will require close perioperative monitoring prior to safe discharge: Has G-6PD    The patient has comorbid conditions that will require close perioperative monitoring prior to safe discharge, including N/A.   This may require acute care beyond the usual and routine recovery period. As such, inpatient admission post-operatively is expected and appropriate, and anticipated hospital length of stay will be >2 midnights.    Pre-Admission Testing Needed: no   Labs that should be ordered: cbc and urine pregnancy test    Order PAT that is recommended in prep for procedure?: Yes    Medical Clearance Needed: no; Provider: N/A    MA Form Signed (tubals/hysterectomy): Not Indicated    Surgical Drink Given: no     How many days out of work: 2 week(s)     How many days no drivin week(s)       Is pre op appt needed?  yes  Interval for post op appt: 2 week(s)     For Surgical Scheduler:   ## Patient plans on repeating pelvic ultrasound in November- knows we are scheduling out to new year.    Surgery Scheduled On:  [place]: choice    Pre-op Appt:   Post op Appt:  Consult/Medical clearance appt:  
Left message for pt to call back to schedule surgery with Dr. Shaikh.  
NSR 60's

## 2024-11-21 ENCOUNTER — TELEPHONE (OUTPATIENT)
Dept: OBGYN CLINIC | Facility: CLINIC | Age: 49
End: 2024-11-21

## 2024-11-21 NOTE — TELEPHONE ENCOUNTER
I tried calling pt but the message on her phone said that she wasn't available and to try my call later. I sent pt a Face to Face Live message to call me back to r/s her surgery because Dr. Shaikh will be on vacation.

## 2024-11-21 NOTE — TELEPHONE ENCOUNTER
Pt called back and I spoke to her to r/s her surgery. I offered her ealier dates at different hospitals but she did not want to go to them she only wanted [facility]. Pt's surgery is no r/s to 3/25/24 @ [facility].

## 2024-12-02 ENCOUNTER — OFFICE VISIT (OUTPATIENT)
Dept: FAMILY MEDICINE CLINIC | Facility: CLINIC | Age: 49
End: 2024-12-02
Payer: COMMERCIAL

## 2024-12-02 VITALS
BODY MASS INDEX: 23.77 KG/M2 | HEIGHT: 62 IN | SYSTOLIC BLOOD PRESSURE: 128 MMHG | RESPIRATION RATE: 18 BRPM | TEMPERATURE: 97.6 F | DIASTOLIC BLOOD PRESSURE: 82 MMHG | WEIGHT: 129.2 LBS | HEART RATE: 88 BPM

## 2024-12-02 DIAGNOSIS — L30.9 DERMATITIS: Primary | ICD-10-CM

## 2024-12-02 PROCEDURE — 99213 OFFICE O/P EST LOW 20 MIN: CPT | Performed by: NURSE PRACTITIONER

## 2024-12-02 RX ORDER — PREDNISONE 10 MG/1
TABLET ORAL
Qty: 20 TABLET | Refills: 0 | Status: SHIPPED | OUTPATIENT
Start: 2024-12-02 | End: 2024-12-12

## 2024-12-02 NOTE — PROGRESS NOTES
"Name: Marti Sethi      : 1975      MRN: [id number]  Encounter Provider: JENNIFER Olvera  Encounter Date: 2024   Encounter department: [facility]  :  Chief Complaint   Patient presents with    Eye Problem     Swollen left eye on and off x 1 week   Iona Delcid CMA        Assessment & Plan  Dermatitis  Take prednisone with food in morning and do not take any NSAID's while taking prednisone.   Continue with xyzal and will start pepcid  Orders:    predniSONE 10 mg tablet; 4 tabs x 2 days, 3 tabs x 2 days, 2 tabs x 2 days, 1 tab x 2 days           History of Present Illness     Patient stated that has itchy rash on and off since weather change in October. Has h/o eczema. Taking xyzal but no relief. Denies fever, chills and sob. Also having swelling of eyes on and off        Review of Systems   HENT: Negative.     Eyes:  Negative for photophobia, pain, discharge, redness, itching and visual disturbance.   Respiratory: Negative.     Cardiovascular: Negative.    Gastrointestinal: Negative.    Skin:  Positive for rash.          Objective   /82   Pulse 88   Temp 97.6 °F (36.4 °C)   Resp 18   Ht 5' 2\" (1.575 m)   Wt 58.6 kg (129 lb 3.2 oz)   LMP 2024 (Exact Date)   BMI 23.63 kg/m²      Physical Exam  HENT:      Head: Normocephalic.   Eyes:      Conjunctiva/sclera: Conjunctivae normal.      Comments: Left eye mildly swollen around lateral side   Cardiovascular:      Rate and Rhythm: Normal rate and regular rhythm.      Pulses: Normal pulses.      Heart sounds: Normal heart sounds.   Pulmonary:      Effort: Pulmonary effort is normal.      Breath sounds: Normal breath sounds.   Skin:     General: Skin is warm and dry.      Findings: Rash (scattered erythematous pruritic macular rash on face and mild swelling) present.   Neurological:      Mental Status: She is alert and oriented to person, place, and time.   Psychiatric:         Mood and Affect: Mood normal.         Behavior: " Behavior normal.         Thought Content: Thought content normal.         Judgment: Judgment normal.

## 2024-12-02 NOTE — PATIENT INSTRUCTIONS
Adult Patient Counseling Points     Prednisone   What is this drug used for?   It is used for many health problems like allergy signs, asthma, adrenal gland problems, blood problems, skin rashes, or swelling problems. This is not a list of all health problems that this drug may be used for. Talk with the doctor.  All drugs may cause side effects. However, many people have no side effects or only have minor side effects. Call your doctor or get medical help if any of these side effects or any other side effects bother you or do not go away:   Upset stomach or throwing up  Trouble sleeping  Restlessness  Sweating a lot  Dizziness or headache  WARNING/CAUTION: Even though it may be rare, some people may have very bad and sometimes deadly side effects when taking a drug. Tell your doctor or get medical help right away if you have any of the following signs or symptoms that may be related to a very bad side effect:   Infection like fever, chills, flu-like signs, very bad sore throat, ear or sinus pain, cough, more sputum or change in color of sputum, pain with passing urine, mouth sores, or a wound that will not heal  Low potassium levels like muscle pain or weakness, muscle cramps, or a heartbeat that does not feel normal  Pancreas problem (pancreatitis) like very bad stomach pain, very bad back pain, or very bad upset stomach or throwing up  High or low blood pressure like very bad headache or dizziness, passing out, or change in eyesight  Weak adrenal gland like a very bad upset stomach or throwing up, very bad dizziness or passing out, muscle weakness, feeling very tired, mood changes, not hungry, or weight loss  Cushing's disease like weight gain in the upper back or belly, moon face, very bad headache, or slow healing  High blood sugar like confusion, feeling sleepy, more thirst, more hungry, passing urine more often, flushing, fast breathing, or breath that smells like fruit  Shortness of breath, a big weight  gain, or swelling in the arms or legs  Skin changes (pimples, stretch marks, slow healing, hair growth)  Fast, slow, or abnormal heartbeat  Chest pain or pressure  Swelling, warmth, numbness, change of color, or pain in a leg or arm  Period (menstrual) changes  Bone or joint pain  Feeling very tired or weak  Change in eyesight  Feeling confused, not able to focus, or change in behavior  Mood changes  Memory problems or loss  Hallucinations (seeing or hearing things that are not there)  Seizures  A burning, numbness, or tingling feeling that is not normal  Very bad belly pain  Any unexplained bruising or bleeding  Black, tarry, or bloody stools  Throwing up blood or throw up that looks like coffee grounds  Signs of an allergic reaction, like rash; hives; itching; red, swollen, blistered, or peeling skin with or without fever; wheezing; tightness in the chest or throat; trouble breathing, swallowing, or talking; unusual hoarseness; or swelling of the mouth, face, lips, tongue, or throat.  Note: This is not a comprehensive list of all side effects. Talk to your doctor if you have questions.  Consumer Information Use and Disclaimer: This information should not be used to decide whether or not to take this medicine or any other medicine. Only the healthcare provider has the knowledge and training to decide which medicines are right for a specific patient. This information does not endorse any medicine as safe, effective, or approved for treating any patient or health condition. This is only a limited summary of general information about the medicine's uses from the patient education leaflet and is not intended to be comprehensive. This limited summary does NOT include all information available about the possible uses, directions, warnings, precautions, interactions, adverse effects, or risks that may apply to this medicine. This information is not intended to provide medical advice, diagnosis or treatment and does not  replace information you receive from the healthcare provider. For a more detailed summary of information about the risks and benefits of using this medicine, please speak with your healthcare provider and review the entire patient education leaflet.  Copyright   © 2024 [employer] and its affiliates and/or licensors. All rights reserved.  Last Reviewed Date   2021-08-03

## 2024-12-03 ENCOUNTER — RESULTS FOLLOW-UP (OUTPATIENT)
Dept: OBGYN CLINIC | Facility: CLINIC | Age: 49
End: 2024-12-03

## 2024-12-13 ENCOUNTER — TELEPHONE (OUTPATIENT)
Dept: FAMILY MEDICINE CLINIC | Facility: CLINIC | Age: 49
End: 2024-12-13

## 2024-12-13 DIAGNOSIS — H57.89 EYE SWELLING, BILATERAL: Primary | ICD-10-CM

## 2024-12-13 RX ORDER — CEPHALEXIN 500 MG/1
500 CAPSULE ORAL EVERY 12 HOURS SCHEDULED
Qty: 20 CAPSULE | Refills: 0 | Status: SHIPPED | OUTPATIENT
Start: 2024-12-13 | End: 2024-12-23

## 2024-12-13 RX ORDER — PREDNISONE 10 MG/1
TABLET ORAL
Qty: 20 TABLET | Refills: 0 | Status: SHIPPED | OUTPATIENT
Start: 2024-12-13 | End: 2024-12-23

## 2024-12-13 NOTE — TELEPHONE ENCOUNTER
Patient and saw her eyes on picture as bilaterally got swollen again. Stated that steroids helped a lot and swelling was reduced completely and once off steroids for 2 days then swelling restarted. Taking zyrted and pepcid. Will do another round of steroids with antibiotics and will continue with antihistamine and will follow back if still having issues. Take medication with food.  It is important that you take the entire course of antibiotics prescribed.  May also take a probiotic of your choice to maintain healthy GI isela.    Can take some probiotic and yogurt with the medication.  Take prednisone with food in morning and do not take any NSAID's while taking prednisone.  JENNIFER Olvera

## 2025-02-04 ENCOUNTER — HOSPITAL ENCOUNTER (OUTPATIENT)
Dept: RADIOLOGY | Facility: HOSPITAL | Age: 50
Discharge: HOME | End: 2025-02-04
Attending: STUDENT IN AN ORGANIZED HEALTH CARE EDUCATION/TRAINING PROGRAM
Payer: COMMERCIAL

## 2025-02-04 DIAGNOSIS — R19.00 PELVIC MASS: ICD-10-CM

## 2025-02-04 PROCEDURE — 76856 US EXAM PELVIC COMPLETE: CPT

## 2025-02-04 PROCEDURE — 76830 TRANSVAGINAL US NON-OB: CPT

## 2025-02-11 ENCOUNTER — RESULTS FOLLOW-UP (OUTPATIENT)
Dept: OBGYN CLINIC | Facility: CLINIC | Age: 50
End: 2025-02-11

## 2025-02-11 ENCOUNTER — TELEPHONE (OUTPATIENT)
Dept: OBGYN CLINIC | Facility: CLINIC | Age: 50
End: 2025-02-11

## 2025-02-11 DIAGNOSIS — N83.8 OVARIAN MASS, RIGHT: Primary | ICD-10-CM

## 2025-02-11 NOTE — TELEPHONE ENCOUNTER
Called patient and reviewed updated imaging-  Recent pelvic ultrasound showing overall stable but slightly decreased in size right ovarian mass with thick septation measuring 5.8 x 5.8 x 5.4 cm ORADS 3.  Patient is overall asymptomatic from cyst and would prefer to not have surgery if possible.    Reviewed options for close interval imaging versus surgical excision which she is scheduled for in March.  She would prefer a close interval imaging if possible and tumor markers ordered to assess for any concerns of elevation.  If normal will plan for close interval imaging as requested by patient.    Masha Shaikh MD  02/11/25

## 2025-02-27 ENCOUNTER — APPOINTMENT (OUTPATIENT)
Dept: LAB | Facility: HOSPITAL | Age: 50
End: 2025-02-27
Attending: STUDENT IN AN ORGANIZED HEALTH CARE EDUCATION/TRAINING PROGRAM
Payer: COMMERCIAL

## 2025-02-27 ENCOUNTER — HOSPITAL ENCOUNTER (OUTPATIENT)
Dept: RADIOLOGY | Facility: HOSPITAL | Age: 50
Discharge: HOME | End: 2025-02-27
Payer: COMMERCIAL

## 2025-02-27 DIAGNOSIS — Z12.31 ENCOUNTER FOR SCREENING MAMMOGRAM FOR MALIGNANT NEOPLASM OF BREAST: ICD-10-CM

## 2025-02-27 DIAGNOSIS — N83.8 OVARIAN MASS, RIGHT: ICD-10-CM

## 2025-02-27 LAB
CANCER AG125 SERPL-ACNC: 29.2 U/ML (ref 0–35)
CEA SERPL-MCNC: 1.9 NG/ML (ref 0–3)

## 2025-02-27 PROCEDURE — 86304 IMMUNOASSAY TUMOR CA 125: CPT

## 2025-02-27 PROCEDURE — 82378 CARCINOEMBRYONIC ANTIGEN: CPT

## 2025-02-27 PROCEDURE — 36415 COLL VENOUS BLD VENIPUNCTURE: CPT

## 2025-02-27 PROCEDURE — 77063 BREAST TOMOSYNTHESIS BI: CPT

## 2025-02-27 PROCEDURE — 77067 SCR MAMMO BI INCL CAD: CPT

## 2025-02-28 ENCOUNTER — RESULTS FOLLOW-UP (OUTPATIENT)
Dept: OBGYN CLINIC | Facility: CLINIC | Age: 50
End: 2025-02-28

## 2025-03-03 ENCOUNTER — TELEPHONE (OUTPATIENT)
Age: 50
End: 2025-03-03

## 2025-03-03 NOTE — TELEPHONE ENCOUNTER
Pt called in regarding her Medical certification form that she had dropped off in the office on 2/21/25. States it is due by 3/6/25, wondering when she can expect that to be done. 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

## 2025-03-04 ENCOUNTER — RESULTS FOLLOW-UP (OUTPATIENT)
Dept: LABOR AND DELIVERY | Facility: HOSPITAL | Age: 50
End: 2025-03-04

## 2025-03-13 ENCOUNTER — APPOINTMENT (OUTPATIENT)
Dept: LAB | Facility: CLINIC | Age: 50
End: 2025-03-13
Payer: COMMERCIAL

## 2025-03-13 DIAGNOSIS — Z01.818 PRE-OP TESTING: ICD-10-CM

## 2025-03-13 DIAGNOSIS — N83.8 BILATERAL TUBO-OVARIAN MASS: ICD-10-CM

## 2025-03-13 LAB
ANION GAP SERPL CALCULATED.3IONS-SCNC: 8 MMOL/L (ref 4–13)
BASOPHILS # BLD AUTO: 0.13 THOUSANDS/ÂΜL (ref 0–0.1)
BASOPHILS NFR BLD AUTO: 3 % (ref 0–1)
BUN SERPL-MCNC: 13 MG/DL (ref 5–25)
CALCIUM SERPL-MCNC: 9.1 MG/DL (ref 8.4–10.2)
CHLORIDE SERPL-SCNC: 101 MMOL/L (ref 96–108)
CO2 SERPL-SCNC: 28 MMOL/L (ref 21–32)
CREAT SERPL-MCNC: 0.59 MG/DL (ref 0.6–1.3)
EOSINOPHIL # BLD AUTO: 0.22 THOUSAND/ÂΜL (ref 0–0.61)
EOSINOPHIL NFR BLD AUTO: 4 % (ref 0–6)
ERYTHROCYTE [DISTWIDTH] IN BLOOD BY AUTOMATED COUNT: 11.6 % (ref 11.6–15.1)
GFR SERPL CREATININE-BSD FRML MDRD: 109 ML/MIN/1.73SQ M
GLUCOSE P FAST SERPL-MCNC: 100 MG/DL (ref 65–99)
HCT VFR BLD AUTO: 40.9 % (ref 34.8–46.1)
HGB BLD-MCNC: 13.6 G/DL (ref 11.5–15.4)
IMM GRANULOCYTES # BLD AUTO: 0.02 THOUSAND/UL (ref 0–0.2)
IMM GRANULOCYTES NFR BLD AUTO: 0 % (ref 0–2)
LYMPHOCYTES # BLD AUTO: 1.66 THOUSANDS/ÂΜL (ref 0.6–4.47)
LYMPHOCYTES NFR BLD AUTO: 32 % (ref 14–44)
MCH RBC QN AUTO: 31.9 PG (ref 26.8–34.3)
MCHC RBC AUTO-ENTMCNC: 33.3 G/DL (ref 31.4–37.4)
MCV RBC AUTO: 96 FL (ref 82–98)
MONOCYTES # BLD AUTO: 0.44 THOUSAND/ÂΜL (ref 0.17–1.22)
MONOCYTES NFR BLD AUTO: 8 % (ref 4–12)
NEUTROPHILS # BLD AUTO: 2.74 THOUSANDS/ÂΜL (ref 1.85–7.62)
NEUTS SEG NFR BLD AUTO: 53 % (ref 43–75)
NRBC BLD AUTO-RTO: 0 /100 WBCS
PLATELET # BLD AUTO: 397 THOUSANDS/UL (ref 149–390)
PMV BLD AUTO: 9.4 FL (ref 8.9–12.7)
POTASSIUM SERPL-SCNC: 4.3 MMOL/L (ref 3.5–5.3)
RBC # BLD AUTO: 4.27 MILLION/UL (ref 3.81–5.12)
SODIUM SERPL-SCNC: 137 MMOL/L (ref 135–147)
WBC # BLD AUTO: 5.21 THOUSAND/UL (ref 4.31–10.16)

## 2025-03-13 PROCEDURE — 85025 COMPLETE CBC W/AUTO DIFF WBC: CPT

## 2025-03-13 PROCEDURE — 36415 COLL VENOUS BLD VENIPUNCTURE: CPT

## 2025-03-13 PROCEDURE — 80048 BASIC METABOLIC PNL TOTAL CA: CPT

## 2025-03-14 ENCOUNTER — RESULTS FOLLOW-UP (OUTPATIENT)
Dept: OBGYN CLINIC | Facility: CLINIC | Age: 50
End: 2025-03-14

## 2025-03-20 RX ORDER — CETIRIZINE HYDROCHLORIDE 10 MG/1
TABLET ORAL
COMMUNITY
Start: 2024-12-09

## 2025-03-20 NOTE — PRE-PROCEDURE INSTRUCTIONS
Pre-Surgery Instructions:   Medication Instructions    Calcium Carb-Cholecalciferol (Calcium 1000 + D) 1000-800 MG-UNIT TABS Stop taking 7 days prior to surgery.    cetirizine (ZyrTEC Allergy) 10 mg tablet Take night before surgery    Cholecalciferol (VITAMIN D) 2000 units CAPS Hold day of surgery.    clobetasol (TEMOVATE) 0.05 % cream Stop taking 1 day prior to surgery.    Multiple Vitamin (MULTI-VITAMIN DAILY) TABS Stop taking 7 days prior to surgery.   Medication instructions for day of surgery reviewed. Please take all instructed medications with only a sip of water.       You will receive a call one business day prior to surgery with an arrival time and hospital directions. If your surgery is scheduled on a Monday, the hospital will be calling you on the Friday prior to your surgery. If you have not heard from anyone by 8pm, please call the hospital supervisor through the hospital  at [phone]. ([place] [phone] or [place] [phone]).    Do not eat or drink anything after midnight the night before your surgery, including candy, mints, lifesavers, or chewing gum. Do not drink alcohol 24hrs before your surgery. Try not to smoke at least 24hrs before your surgery.       Follow the pre surgery showering instructions as listed in the “My Surgical Experience Booklet” or otherwise provided by your surgeon's office. Do not use a blade to shave the surgical area 1 week before surgery. It is okay to use a clean electric clippers up to 24 hours before surgery. Do not apply any lotions, creams, including makeup, cologne, deodorant, or perfumes after showering on the day of your surgery. Do not use dry shampoo, hair spray, hair gel, or any type of hair products.     No contact lenses, eye make-up, or artificial eyelashes. Remove nail polish, including gel polish, and any artificial, gel, or acrylic nails if possible. Remove all jewelry including rings and body piercing jewelry.     Wear causal clothing  that is easy to take on and off. Consider your type of surgery.    Keep any valuables, jewelry, piercings at home. Please bring any specially ordered equipment (sling, braces) if indicated.    Arrange for a responsible person to drive you to and from the hospital on the day of your surgery. Please confirm the visitor policy for the day of your procedure when you receive your phone call with an arrival time.     Call the surgeon's office with any new illnesses, exposures, or additional questions prior to surgery.    Please reference your “My Surgical Experience Booklet” for additional information to prepare for your upcoming surgery.

## 2025-03-25 ENCOUNTER — ANESTHESIA (OUTPATIENT)
Dept: PERIOP | Facility: HOSPITAL | Age: 50
End: 2025-03-25
Payer: COMMERCIAL

## 2025-03-25 ENCOUNTER — HOSPITAL ENCOUNTER (OUTPATIENT)
Facility: HOSPITAL | Age: 50
Setting detail: OUTPATIENT SURGERY
Discharge: HOME | End: 2025-03-26
Attending: STUDENT IN AN ORGANIZED HEALTH CARE EDUCATION/TRAINING PROGRAM | Admitting: STUDENT IN AN ORGANIZED HEALTH CARE EDUCATION/TRAINING PROGRAM
Payer: COMMERCIAL

## 2025-03-25 ENCOUNTER — ANESTHESIA EVENT (OUTPATIENT)
Dept: PERIOP | Facility: HOSPITAL | Age: 50
End: 2025-03-25
Payer: COMMERCIAL

## 2025-03-25 VITALS
OXYGEN SATURATION: 93 % | BODY MASS INDEX: 23.55 KG/M2 | DIASTOLIC BLOOD PRESSURE: 64 MMHG | HEIGHT: 62 IN | SYSTOLIC BLOOD PRESSURE: 101 MMHG | HEART RATE: 75 BPM | RESPIRATION RATE: 18 BRPM | TEMPERATURE: 97.8 F | WEIGHT: 128 LBS

## 2025-03-25 DIAGNOSIS — N83.8 BILATERAL TUBO-OVARIAN MASS: ICD-10-CM

## 2025-03-25 DIAGNOSIS — Z90.721 S/P RIGHT OOPHORECTOMY: Primary | ICD-10-CM

## 2025-03-25 LAB
EXT PREGNANCY TEST URINE: NEGATIVE
EXT. CONTROL: NORMAL

## 2025-03-25 PROCEDURE — 81025 URINE PREGNANCY TEST: CPT | Performed by: STUDENT IN AN ORGANIZED HEALTH CARE EDUCATION/TRAINING PROGRAM

## 2025-03-25 PROCEDURE — 88307 TISSUE EXAM BY PATHOLOGIST: CPT | Performed by: STUDENT IN AN ORGANIZED HEALTH CARE EDUCATION/TRAINING PROGRAM

## 2025-03-25 PROCEDURE — 88112 CYTOPATH CELL ENHANCE TECH: CPT | Performed by: STUDENT IN AN ORGANIZED HEALTH CARE EDUCATION/TRAINING PROGRAM

## 2025-03-25 PROCEDURE — 88305 TISSUE EXAM BY PATHOLOGIST: CPT | Performed by: STUDENT IN AN ORGANIZED HEALTH CARE EDUCATION/TRAINING PROGRAM

## 2025-03-25 PROCEDURE — 58661 LAPAROSCOPY REMOVE ADNEXA: CPT | Performed by: STUDENT IN AN ORGANIZED HEALTH CARE EDUCATION/TRAINING PROGRAM

## 2025-03-25 PROCEDURE — NC001 PR NO CHARGE: Performed by: STUDENT IN AN ORGANIZED HEALTH CARE EDUCATION/TRAINING PROGRAM

## 2025-03-25 RX ORDER — FENTANYL CITRATE/PF 50 MCG/ML
50 SYRINGE (ML) INJECTION
Status: DISCONTINUED | OUTPATIENT
Start: 2025-03-25 | End: 2025-03-25 | Stop reason: HOSPADM

## 2025-03-25 RX ORDER — IBUPROFEN 600 MG/1
600 TABLET, FILM COATED ORAL EVERY 6 HOURS PRN
Status: DISCONTINUED | OUTPATIENT
Start: 2025-03-25 | End: 2025-03-26 | Stop reason: HOSPADM

## 2025-03-25 RX ORDER — OXYCODONE HYDROCHLORIDE 10 MG/1
10 TABLET ORAL EVERY 4 HOURS PRN
Status: DISCONTINUED | OUTPATIENT
Start: 2025-03-25 | End: 2025-03-26 | Stop reason: HOSPADM

## 2025-03-25 RX ORDER — PROPOFOL 10 MG/ML
INJECTION, EMULSION INTRAVENOUS CONTINUOUS PRN
Status: DISCONTINUED | OUTPATIENT
Start: 2025-03-25 | End: 2025-03-25

## 2025-03-25 RX ORDER — PROPOFOL 10 MG/ML
INJECTION, EMULSION INTRAVENOUS AS NEEDED
Status: DISCONTINUED | OUTPATIENT
Start: 2025-03-25 | End: 2025-03-25

## 2025-03-25 RX ORDER — ONDANSETRON 2 MG/ML
4 INJECTION INTRAMUSCULAR; INTRAVENOUS ONCE AS NEEDED
Status: DISCONTINUED | OUTPATIENT
Start: 2025-03-25 | End: 2025-03-25 | Stop reason: HOSPADM

## 2025-03-25 RX ORDER — ACETAMINOPHEN 325 MG/1
975 TABLET ORAL EVERY 6 HOURS PRN
Status: DISCONTINUED | OUTPATIENT
Start: 2025-03-25 | End: 2025-03-26 | Stop reason: HOSPADM

## 2025-03-25 RX ORDER — HYDROMORPHONE HCL/PF 1 MG/ML
SYRINGE (ML) INJECTION AS NEEDED
Status: DISCONTINUED | OUTPATIENT
Start: 2025-03-25 | End: 2025-03-25

## 2025-03-25 RX ORDER — LIDOCAINE HYDROCHLORIDE 10 MG/ML
0.5 INJECTION, SOLUTION EPIDURAL; INFILTRATION; INTRACAUDAL; PERINEURAL ONCE AS NEEDED
Status: DISCONTINUED | OUTPATIENT
Start: 2025-03-25 | End: 2025-03-25 | Stop reason: HOSPADM

## 2025-03-25 RX ORDER — MIDAZOLAM HYDROCHLORIDE 2 MG/2ML
INJECTION, SOLUTION INTRAMUSCULAR; INTRAVENOUS AS NEEDED
Status: DISCONTINUED | OUTPATIENT
Start: 2025-03-25 | End: 2025-03-25

## 2025-03-25 RX ORDER — ACETAMINOPHEN 10 MG/ML
1000 INJECTION, SOLUTION INTRAVENOUS ONCE
Status: COMPLETED | OUTPATIENT
Start: 2025-03-25 | End: 2025-03-25

## 2025-03-25 RX ORDER — ROCURONIUM BROMIDE 10 MG/ML
INJECTION, SOLUTION INTRAVENOUS AS NEEDED
Status: DISCONTINUED | OUTPATIENT
Start: 2025-03-25 | End: 2025-03-25

## 2025-03-25 RX ORDER — MAGNESIUM HYDROXIDE 1200 MG/15ML
LIQUID ORAL AS NEEDED
Status: DISCONTINUED | OUTPATIENT
Start: 2025-03-25 | End: 2025-03-25 | Stop reason: HOSPADM

## 2025-03-25 RX ORDER — LIDOCAINE HYDROCHLORIDE 10 MG/ML
INJECTION, SOLUTION EPIDURAL; INFILTRATION; INTRACAUDAL; PERINEURAL AS NEEDED
Status: DISCONTINUED | OUTPATIENT
Start: 2025-03-25 | End: 2025-03-25

## 2025-03-25 RX ORDER — OXYCODONE HYDROCHLORIDE 5 MG/1
5 TABLET ORAL EVERY 4 HOURS PRN
Qty: 10 TABLET | Refills: 0 | Status: SHIPPED | OUTPATIENT
Start: 2025-03-25 | End: 2025-04-04

## 2025-03-25 RX ORDER — ONDANSETRON 2 MG/ML
4 INJECTION INTRAMUSCULAR; INTRAVENOUS EVERY 6 HOURS PRN
Status: DISCONTINUED | OUTPATIENT
Start: 2025-03-25 | End: 2025-03-26 | Stop reason: HOSPADM

## 2025-03-25 RX ORDER — FENTANYL CITRATE 50 UG/ML
INJECTION, SOLUTION INTRAMUSCULAR; INTRAVENOUS AS NEEDED
Status: DISCONTINUED | OUTPATIENT
Start: 2025-03-25 | End: 2025-03-25

## 2025-03-25 RX ORDER — BUPIVACAINE HYDROCHLORIDE 2.5 MG/ML
INJECTION, SOLUTION EPIDURAL; INFILTRATION; INTRACAUDAL; PERINEURAL AS NEEDED
Status: DISCONTINUED | OUTPATIENT
Start: 2025-03-25 | End: 2025-03-25 | Stop reason: HOSPADM

## 2025-03-25 RX ORDER — SODIUM CHLORIDE, SODIUM LACTATE, POTASSIUM CHLORIDE, CALCIUM CHLORIDE 600; 310; 30; 20 MG/100ML; MG/100ML; MG/100ML; MG/100ML
125 INJECTION, SOLUTION INTRAVENOUS CONTINUOUS
Status: DISCONTINUED | OUTPATIENT
Start: 2025-03-25 | End: 2025-03-26 | Stop reason: HOSPADM

## 2025-03-25 RX ORDER — DEXAMETHASONE SODIUM PHOSPHATE 10 MG/ML
INJECTION, SOLUTION INTRAMUSCULAR; INTRAVENOUS AS NEEDED
Status: DISCONTINUED | OUTPATIENT
Start: 2025-03-25 | End: 2025-03-25

## 2025-03-25 RX ORDER — OXYCODONE HYDROCHLORIDE 5 MG/1
5 TABLET ORAL EVERY 4 HOURS PRN
Status: DISCONTINUED | OUTPATIENT
Start: 2025-03-25 | End: 2025-03-26 | Stop reason: HOSPADM

## 2025-03-25 RX ORDER — KETOROLAC TROMETHAMINE 30 MG/ML
INJECTION, SOLUTION INTRAMUSCULAR; INTRAVENOUS AS NEEDED
Status: DISCONTINUED | OUTPATIENT
Start: 2025-03-25 | End: 2025-03-25

## 2025-03-25 RX ORDER — IBUPROFEN 600 MG/1
600 TABLET, FILM COATED ORAL EVERY 6 HOURS PRN
Qty: 30 TABLET | Refills: 0 | Status: SHIPPED | OUTPATIENT
Start: 2025-03-25

## 2025-03-25 RX ORDER — HYDROMORPHONE HCL/PF 1 MG/ML
0.5 SYRINGE (ML) INJECTION
Status: DISCONTINUED | OUTPATIENT
Start: 2025-03-25 | End: 2025-03-25 | Stop reason: HOSPADM

## 2025-03-25 RX ORDER — SODIUM CHLORIDE, SODIUM LACTATE, POTASSIUM CHLORIDE, CALCIUM CHLORIDE 600; 310; 30; 20 MG/100ML; MG/100ML; MG/100ML; MG/100ML
INJECTION, SOLUTION INTRAVENOUS CONTINUOUS PRN
Status: DISCONTINUED | OUTPATIENT
Start: 2025-03-25 | End: 2025-03-25

## 2025-03-25 RX ORDER — ONDANSETRON 2 MG/ML
INJECTION INTRAMUSCULAR; INTRAVENOUS AS NEEDED
Status: DISCONTINUED | OUTPATIENT
Start: 2025-03-25 | End: 2025-03-25

## 2025-03-25 RX ADMIN — HYDROMORPHONE HYDROCHLORIDE 0.5 MG: 1 INJECTION, SOLUTION INTRAMUSCULAR; INTRAVENOUS; SUBCUTANEOUS at 19:12

## 2025-03-25 RX ADMIN — KETOROLAC TROMETHAMINE 30 MG: 30 INJECTION, SOLUTION INTRAMUSCULAR; INTRAVENOUS at 19:04

## 2025-03-25 RX ADMIN — ROCURONIUM BROMIDE 10 MG: 10 INJECTION, SOLUTION INTRAVENOUS at 18:29

## 2025-03-25 RX ADMIN — ROCURONIUM BROMIDE 30 MG: 10 INJECTION, SOLUTION INTRAVENOUS at 17:22

## 2025-03-25 RX ADMIN — FENTANYL CITRATE 50 MCG: 50 INJECTION INTRAMUSCULAR; INTRAVENOUS at 17:44

## 2025-03-25 RX ADMIN — SODIUM CHLORIDE, SODIUM LACTATE, POTASSIUM CHLORIDE, AND CALCIUM CHLORIDE: .6; .31; .03; .02 INJECTION, SOLUTION INTRAVENOUS at 17:17

## 2025-03-25 RX ADMIN — HYDROMORPHONE HYDROCHLORIDE 0.5 MG: 1 INJECTION, SOLUTION INTRAMUSCULAR; INTRAVENOUS; SUBCUTANEOUS at 19:17

## 2025-03-25 RX ADMIN — SODIUM CHLORIDE, SODIUM LACTATE, POTASSIUM CHLORIDE, AND CALCIUM CHLORIDE: .6; .31; .03; .02 INJECTION, SOLUTION INTRAVENOUS at 18:49

## 2025-03-25 RX ADMIN — ACETAMINOPHEN 1000 MG: 10 INJECTION INTRAVENOUS at 19:02

## 2025-03-25 RX ADMIN — SODIUM CHLORIDE, SODIUM LACTATE, POTASSIUM CHLORIDE, AND CALCIUM CHLORIDE 125 ML/HR: .6; .31; .03; .02 INJECTION, SOLUTION INTRAVENOUS at 15:04

## 2025-03-25 RX ADMIN — ONDANSETRON 4 MG: 2 INJECTION INTRAMUSCULAR; INTRAVENOUS at 17:17

## 2025-03-25 RX ADMIN — MIDAZOLAM 2 MG: 1 INJECTION INTRAMUSCULAR; INTRAVENOUS at 17:17

## 2025-03-25 RX ADMIN — DEXAMETHASONE SODIUM PHOSPHATE 10 MG: 10 INJECTION, SOLUTION INTRAMUSCULAR; INTRAVENOUS at 17:26

## 2025-03-25 RX ADMIN — LIDOCAINE HYDROCHLORIDE 50 MG: 10 INJECTION, SOLUTION EPIDURAL; INFILTRATION; INTRACAUDAL; PERINEURAL at 17:21

## 2025-03-25 RX ADMIN — SODIUM CHLORIDE, SODIUM LACTATE, POTASSIUM CHLORIDE, AND CALCIUM CHLORIDE 125 ML/HR: .6; .31; .03; .02 INJECTION, SOLUTION INTRAVENOUS at 20:48

## 2025-03-25 RX ADMIN — PROPOFOL 200 MG: 10 INJECTION, EMULSION INTRAVENOUS at 17:21

## 2025-03-25 RX ADMIN — FENTANYL CITRATE 50 MCG: 50 INJECTION INTRAMUSCULAR; INTRAVENOUS at 17:21

## 2025-03-25 RX ADMIN — SUGAMMADEX 150 MG: 100 INJECTION, SOLUTION INTRAVENOUS at 19:14

## 2025-03-25 RX ADMIN — PROPOFOL 50 MCG/KG/MIN: 10 INJECTION, EMULSION INTRAVENOUS at 17:31

## 2025-03-25 NOTE — H&P
History & Physical - OB/GYN   Marti Sethi 49 y.o. female MRN: [id number]  Unit/Bed#: OR [place] Encounter: [id number]      [facility]  Marti eSthi is a 49 y.o.  presenting for laparoscopic right oophorectomy, bilateral salpingectomy and exam under anesthesia for right adnexal mass- left cyst resolved on last ultrasound. Right adnexal mass measuring 5.8 x 4.8 x 5.4 cm complex cyst- ORADS 3.  and CEA within normal limits.    She denies any interval changes in health and denies any pain.      Active Problems:  Patient Active Problem List   Diagnosis    Chronic rhinitis    Eczema    Elevated HDL    History of vitamin D deficiency    Reading cardiac risk <10% in next 10 years    Breast cancer screening by mammogram    Elevated platelet count    Immunization due     PMH:  Past Medical History:   Diagnosis Date    History of closed fracture 2002    Navicular bone of foot    Hyperlipidemia 2013    resolved 2015     PSH:  Past Surgical History:   Procedure Laterality Date    COLONOSCOPY      WISDOM TOOTH EXTRACTION Bilateral      Social Hx:  Social History     Socioeconomic History    Marital status: /Civil Union     Spouse name: Not on file    Number of children: Not on file    Years of education: Not on file    Highest education level: Not on file   Occupational History    Not on file   Tobacco Use    Smoking status: Never     Passive exposure: Past    Smokeless tobacco: Never   Vaping Use    Vaping status: Never Used   Substance and Sexual Activity    Alcohol use: Yes     Alcohol/week: 2.0 standard drinks of alcohol     Types: 2 Cans of beer per week     Comment: socially    Drug use: Never    Sexual activity: Yes     Partners: Male     Birth control/protection: Condom Male   Other Topics Concern    Not on file   Social History Narrative    Not on file     Social Drivers of Health     Financial Resource Strain: Not on file   Food Insecurity: Not on file   Transportation Needs: Not on  "file   Physical Activity: Not on file   Stress: Not on file   Social Connections: Not on file   Intimate Partner Violence: Not on file   Housing Stability: Not on file     OB Hx:  OB History    Para Term  AB Living   2 2 2 0 0 2   SAB IAB Ectopic Multiple Live Births   0 0 0 0 2      # Outcome Date GA Lbr Ivan/2nd Weight Sex Type Anes PTL Lv   2 Term     M Vag-Spont   ANGELIKA   1 Term     M Vag-Spont   ANGELIKA      Obstetric Comments   2 - uncomplicated pregnancies     Family History   Problem Relation Age of Onset    Diabetes Mother     Hypertension Mother     Hypertension Father      Meds:  No current facility-administered medications on file prior to encounter.     Current Outpatient Medications on File Prior to Encounter   Medication Sig Dispense Refill    Calcium Carb-Cholecalciferol (Calcium 1000 + D) 1000-800 MG-UNIT TABS       cetirizine (ZyrTEC Allergy) 10 mg tablet       Cholecalciferol (VITAMIN D) 2000 units CAPS Take by mouth daily      clobetasol (TEMOVATE) 0.05 % cream Apply topically 2 (two) times a day Short term, hands 180 g 3    Multiple Vitamin (MULTI-VITAMIN DAILY) TABS Take by mouth         Allergies:  No Known Allergies    Review of Systems   Constitutional:  Negative for chills and fever.   Eyes:  Negative for visual disturbance.   Respiratory:  Negative for chest tightness, shortness of breath and wheezing.    Cardiovascular:  Negative for chest pain, palpitations and leg swelling.   Gastrointestinal:  Negative for abdominal pain, constipation, diarrhea, nausea and vomiting.   Genitourinary:  Negative for dysuria, flank pain, frequency, hematuria and urgency.   Musculoskeletal:  Negative for arthralgias and myalgias.   Neurological:  Negative for dizziness, weakness, light-headedness and headaches.       Physical Exam:  /80   Pulse 95   Temp 97.6 °F (36.4 °C) (Temporal)   Resp 17   Ht 5' 2\" (1.575 m)   Wt 58.1 kg (128 lb)   LMP 2025 (Exact Date)   SpO2 100%   BMI " 23.41 kg/m²     Physical Exam  Vitals reviewed.   Constitutional:       General: She is not in acute distress.     Appearance: She is well-developed. She is not diaphoretic.   HENT:      Head: Normocephalic and atraumatic.   Cardiovascular:      Rate and Rhythm: Normal rate and regular rhythm.      Heart sounds: Normal heart sounds.   Pulmonary:      Effort: Pulmonary effort is normal. No respiratory distress.      Breath sounds: Normal breath sounds.   Abdominal:      Palpations: Abdomen is soft.      Tenderness: There is no abdominal tenderness. There is no guarding or rebound.   Genitourinary:     Vagina: Normal.   Musculoskeletal:      Cervical back: Normal range of motion and neck supple.      Right lower leg: No edema.      Left lower leg: No edema.   Skin:     General: Skin is warm and dry.   Neurological:      Mental Status: She is alert and oriented to person, place, and time.   Psychiatric:         Behavior: Behavior normal.         Assessment:   49 y.o.  with right complex adnexal mass ORADS 3 with plans for right oophorectomy and bilateral salpingectomy with exam under anesthesia.  Patient with notable G6PD deficiency and plan to avoid high risk medications.  FEN: NPO,  cc/hr  VTE ppx: SCDs, early ambulation    Masha Shaikh MD  25

## 2025-03-25 NOTE — DISCHARGE INSTR - AVS FIRST PAGE
DISCHARGE INSTRUCTIONS:     Contact your doctor at the number above if:   You have a fever over 101o.  You have nausea or are vomiting that does not improve after a light meal.   Your pain is getting worse, even after you take medicine.   You feel pain or burning when you urinate, or you have trouble urinating.   You have pus or a foul-smelling odor coming from your vagina and/or wound.    Your wound is red, swollen, or draining pus.  You see new or an increased amount of bright red blood coming from your vagina or your incisions.   You have questions or concerns about your condition or care.    Seek care immediately:   Your arm or leg feels warm, tender, and painful. It may look swollen and red.  You have increasing abdominal or pelvic pain.   You have heavy vaginal bleeding that fills 1 or more sanitary pads in 1 hour.    Call 911 for any of the following:   You feel lightheaded, short of breath, and have chest pain.   You cough up blood.    Medicines: You may need any of the following:  Prescription medicine will not be given for this procedure.    Resume your previous medications as directed.  Extra-strength Tylenol:  This medications over-the-counter.  Take 2 tablets every 6 hours as needed for mild-to-moderate pain.  Ibuprofen/Advil/Motrin 200 mg tablets:  This medication is over-the-counter.  Take 3 tablets every 8 hours as needed for moderate to severe pain.  Take this medication with food.  The drink plenty of fluids while using this medication to prevent kidney injury.  Metamucil or MiraLax:  This product is over-the-counter.  Distal 1 large tbsp in a large glass of water.  Use once or twice a day for 1-2 weeks prevent and or treat constipation.    Take your medicines as directed. Contact your healthcare provider if you think your medicine is not helping or if you have side effects. Tell him or her if you are allergic to any medicine. Keep a list of the medicines, vitamins, and herbs you take. Include the  amounts, and when and why you take them. Bring the list or the pill bottles to follow-up visits. Carry your medicine list with you in case of an emergency.    Activity:   Rest as needed. Get up and move around as directed to help prevent blood clots. Start with short walks and slowly increase the distance every day. Limit the number of times you climb stairs to 2 times each day for the first week. Plan most of your daily activities on one level of your home.      Do not lift objects heavier than 10 pounds until seen in the office for your follow-up appointment.      Do not strain during bowel movements. High-fiber foods and extra liquids can help you prevent constipation. Examples of high-fiber foods are fruit and bran. Prune juice and water are good liquids to drink.      Do not have sex, use tampons, or douche and do not do tub baths/swimming until cleared by your physician at your postoperative appointment.    You may shower as soon as the day after surgery.  Do not go into pools or hot tubs until cleared by your doctor.      Ask when it is safe for you to drive. It is generally safe to drive as soon as your legs are strong, you are off pain medicines and you feel like you will have the appropriate reflexes to step on the breaks in case of an emergency.    Ask when you may return to work and to other regular activities.    Wound care: Care for your abdominal incisions as directed. Carefully wash around the wound with soap and water. If you have Hibiclens or medicated soap that you were instructed to use before surgery, you may use that to wash with for up to 2 days after surgery.  If not, any mild non-scented, non-abrasive soap is safe.  It is okay to let the soap and water run over your incision. Do not scrub your incision. Dry the area and put on new, clean bandages as directed. Change your bandages when they get wet or dirty. If you have strips of medical tape, let them fall off on their own. It may take 7 to  14 days for them to fall off. Check your incision every day for redness, swelling, or pus.   Deep breathing: Take deep breaths and cough 10 times each hour. This will decrease your risk for a lung infection. Take a deep breath and hold it for as long as you can. Let the air out and then cough strongly. Deep breaths help open your airway. You may be given an incentive spirometer to help you take deep breaths. Put the plastic piece in your mouth and take a slow, deep breath, then let the air out and cough. Repeat these steps 10 times every hour.   Follow up with your healthcare provider or gynecologist as directed: You may need to return to have stitches removed, and for other tests. Write down your questions so you remember to ask them during your visits.      © 2017 [employer] Information is for End User's use only and may not be sold, redistributed or otherwise used for commercial purposes. All illustrations and images included in CareNotes® are the copyrighted property of [employer] or [employer].  The above information is an  only. It is not intended as medical advice for individual conditions or treatments. Talk to your doctor, nurse or pharmacist before following any medical regimen to see if it is safe and effective for you.

## 2025-03-25 NOTE — ANESTHESIA POSTPROCEDURE EVALUATION
Post-Op Assessment Note    CV Status:  Stable  Pain Score: 0    Pain management: adequate       Mental Status:  Awake   Hydration Status:  Euvolemic   PONV Controlled:  Controlled   Airway Patency:  Patent     Post Op Vitals Reviewed: Yes    No anethesia notable event occurred.    Staff: CRNA   Comments: Pt awake, alert, able to maintain own airway, VSS, report to recovery RN          Last Filed PACU Vitals:  Vitals Value Taken Time   Temp 97.4    Pulse 76 03/25/25 1930   /59 03/25/25 1931   Resp 14    SpO2 99 % 03/25/25 1930   Vitals shown include unfiled device data.

## 2025-03-25 NOTE — OP NOTE
OPERATIVE REPORT  PATIENT NAME: Marti Sethi    :  1975  MRN: [id number]  Pt Location: [facility] OR ROOM 05    SURGERY DATE: 3/25/2025    Surgeons and Role:     * Masha Shaikh MD - Primary     * Denise Lopez MD - Assisting    Preop Diagnosis:  Bilateral tubo-ovarian mass [N83.8]    Post-Op Diagnosis Codes:     * Bilateral tubo-ovarian mass [N83.8]    Procedure(s):  Bilateral - LAPAROSCOPIC RIGHT SALPINGO-OOPHORECTOMY. LEFT FIMBRIECTOMY. LYSIS OF ADHESIONS. EXAM UNDER ANESTHESIA    Specimen(s):  ID Type Source Tests Collected by Time Destination   1 : LEFT PARTIAL FALLOPIAN TUBE Tissue Fallopian Tube, Left TISSUE EXAM Masha Shaikh MD 3/25/2025 1842    2 : RIGHT OVARY, CYST & FALLOPIAN TUBE Tissue Ovary, Right TISSUE EXAM Masha Shaikh MD 3/25/2025 1805    3 : PELVIC WASHINGS Washing Pelvic Washing NON-GYNECOLOGIC CYTOLOGY Masha Shaikh MD 3/25/2025 1758        Estimated Blood Loss:   50 ml    Drains:  [REMOVED] Urethral Catheter Non-latex 16 Fr. (Removed)   Number of days: 0       Anesthesia Type:   General endotracheal    Operative Indications:  Bilateral tubo-ovarian mass [N83.8]  Right ovarian cyst    Operative Findings:  On bimanual exam there was an anteverted and mobile uterus with a midline palpated pelvic mass about 5 to 6 cm.  On laparoscopy there was notably a 5 to 6 cm right ovarian complex appearing mass that was densely adherent to the posterior uterus-inherent cyst rupture occurred with laparoscopically maneuvering mass that was adherent and was noted to be chocolate filled cyst consistent with endometriosis.  The right fallopian tube was adherent to the ovarian cyst.  The left fimbria was visible and left ovary was not well-visualized.  The posterior cul-de-sac appeared obliterated and the left fallopian tube and ovary was not well-visualized.  Endometriotic implants noted on the bladder.  Of note extensive lysis of adhesions occurred to release the cyst from  the pelvic sidewall and posterior uterus with use of blunt and sharp dissection.  Overall normal-appearing bowel and appendix noted.      Complications:   None    Brief History  Marti Sethi is a 49-year-old  with complex right adnexal mass who presented for laparoscopic right oophorectomy and bilateral salpingectomy and exam under anesthesia. All risks, benefits, and alternatives to the procedure were discussed with the patient and she had the opportunity to ask questions.\ Informed consent was obtained.     Description of Procedure    Patient was taken to the operating room where correct patient and correct procedure were confirmed. General endotracheal anesthesia (GET) was administered and the patient was positioned on the OR table in the dorsal lithotomy position. All pressure points were padded and a taran hugger was placed to maintain control of core body temperature. A bimanual exam was performed and the uterus was noted to be anteverted, normal in size and consistency with no a midline 5 to 6 cm mass palpated. The patient was prepped and draped in the usual sterile fashion with chloroprep on the abdomen and chlorhexidine prep on the vagina and perineum. A time out was performed per protocol.    Operative Technique    A catheter was introduced into the bladder under aseptic technique.  A sponge stick was then placed into the vagina. Sterile gloves were then exchanged and attention was turned to the abdomen.     2 cc of 0.25% Marcaine were infiltrated at the umbilicus and a 5mm incision was made at the inferior edge of the umbilicus for introduction of a 5mm trocar. Trocar was introduced under direct visualization. Pneumoperitoneum was then established to a maximum of 15mmHg. The entire abdomen and pelvis was inspected and there was no evidence of injury to bowel, bladder, vasculature, or other structures. Attention was then turned to the pelvis.    2 additional ports were selected in the left and right  lower abdomen -0.25% Marcaine were injected in both sites which were approximately 2 cm superior and medial to the iliac crests and a 10 mm need on the right for the introduction of a 10 mm trocar and a 5 mm was made on the left for introduction of a 5 mm trocar.  Both were entered into the pelvis under direct visualization.  Patient was then placed in Trendelenburg and a blunt grasper was used to evaluate the pelvis.  A large complex cyst was noted to be densely adherent to the posterior uterus and right pelvic sidewall.  Endometriotic implants were noted on the bladder.  The right fallopian tube was adherent to the right cyst and the left ovary was not well-visualized and only the fimbriated ends could be visualized of the left fallopian tube.  The posterior cul-de-sac was noted to be obliterated.  Washings were performed initially secondary to the complex appearing cyst.    The right infundibulopelvic ligament was easily identified.  The right ureter was able to be visualized clearly.  The right infundibulopelvic ligament was then ligated using the LigaSure device.  The right utero-ovarian vasculature and fallopian tube are noted and both ligated.  With use of a [place] and sharp scissors the adhesions were ligated to release the cyst from the posterior uterus which caused inherent rupture of the cyst which was noted to be filled with dark brown and thick material consistent with chocolate cyst and endometriosis.  The cyst was eventually ligated of the posterior uterus and was placed into the anterior cul-de-sac.  A left fimbriectomy was performed on the left fallopian tube, was removed and sent for pathological evaluation..  The right ovary, cyst and fallopian tube were placed in an Endo Catch bag-the right incision was mildly extended and bag with pathology was removed and sent to pathology for further evaluation.    The pelvis was then copiously irrigated and suctioned.  Raw surfaces were noted on the  posterior uterus and Floseal was placed atop of this area. Attention was then turned to the right port site and the fascia was closed with an 0 Vicryl suture using a fascial closure device.  An additional fascial suture was placed with a UR 6 0-Vicryl suture for optimal reapproximation.      Pneumoperitoneum was allowed to escape. Adequate hemostasis was visualized. The inferior trocars were removed under direct visualization. The laparoscope was withdrawn from the abdomen, followed by its trocar sleeve at the umbilicus. Skin incisions were closed with running absorbable suture of 4-0 monocryl.  Exofin was placed atop the incisions.    Attention was turned to the vagina where the sponge stick and Valenzuela were removed.    At the conclusion of the procedure, all needle, sponge, and instrument counts were noted to be correct x2. Patient tolerated the procedure well and was transferred to PACU in stable condition prior to discharge with follow up in 1-2 weeks.      Patient Disposition:  PACU     SIGNATURE: Masha Shaikh MD  DATE: March 25, 2025  TIME: 7:33 PM

## 2025-03-25 NOTE — ANESTHESIA PREPROCEDURE EVALUATION
Procedure:  LAPAROSCOPIC RIGHT OOPHORECTOMY, BILATERAL SALPINGECTOMY, EXAM UNDER ANESTHESIA AND ALL OTHER INDICATED PROCEDURES (Bilateral: Abdomen)  POSSIBLE LEFT CYSTECTOMY (Left: Abdomen)    Relevant Problems   ANESTHESIA   (-) History of anesthesia complications      CARDIO   (-) Chest pain   (-) DAWKINS (dyspnea on exertion)      PULMONARY   (-) Shortness of breath   (-) Sleep apnea   (-) URI (upper respiratory infection)        Physical Exam    Airway    Mallampati score: II  TM Distance: >3 FB  Neck ROM: full     Dental       Cardiovascular      Pulmonary      Other Findings  post-pubertal.      Anesthesia Plan  ASA Score- 1     Anesthesia Type- general with ASA Monitors.         Additional Monitors:     Airway Plan: ETT.           Plan Factors-Exercise tolerance (METS): >4 METS.    Chart reviewed. EKG reviewed.  Existing labs reviewed. Patient summary reviewed.                  Induction- intravenous.    Postoperative Plan-         Informed Consent- Anesthetic plan and risks discussed with patient.  I personally reviewed this patient with the CRNA. Discussed and agreed on the Anesthesia Plan with the CRNA..      NPO Status:  Vitals Value Taken Time   Date of last liquid 03/25/25 03/25/25 1418   Time of last liquid 0700 03/25/25 1418   Date of last solid 03/24/25 03/25/25 1418   Time of last solid 1800 03/25/25 1418

## 2025-03-26 NOTE — PLAN OF CARE
Problem: PAIN - ADULT  Goal: Verbalizes/displays adequate comfort level or baseline comfort level  Description: Interventions:- Encourage patient to monitor pain and request assistance- Assess pain using appropriate pain scale- Administer analgesics based on type and severity of pain and evaluate response- Implement non-pharmacological measures as appropriate and evaluate response- Consider cultural and social influences on pain and pain management- Notify physician/advanced practitioner if interventions unsuccessful or patient reports new pain  Outcome: Progressing     Problem: INFECTION - ADULT  Goal: Absence or prevention of progression during hospitalization  Description: INTERVENTIONS:- Assess and monitor for signs and symptoms of infection- Monitor lab/diagnostic results- Monitor all insertion sites, i.e. indwelling lines, tubes, and drains- Monitor endotracheal if appropriate and nasal secretions for changes in amount and color- Puryear appropriate cooling/warming therapies per order- Administer medications as ordered- Instruct and encourage patient and family to use good hand hygiene technique- Identify and instruct in appropriate isolation precautions for identified infection/condition  Outcome: Progressing     Problem: DISCHARGE PLANNING  Goal: Discharge to home or other facility with appropriate resources  Description: INTERVENTIONS:- Identify barriers to discharge w/patient and caregiver- Arrange for needed discharge resources and transportation as appropriate- Identify discharge learning needs (meds, wound care, etc.)- Arrange for interpretive services to assist at discharge as needed- Refer to Case Management Department for coordinating discharge planning if the patient needs post-hospital services based on physician/advanced practitioner order or complex needs related to functional status, cognitive ability, or social support system  Outcome: Progressing     Problem: Knowledge Deficit  Goal:  Patient/family/caregiver demonstrates understanding of disease process, treatment plan, medications, and discharge instructions  Description: Complete learning assessment and assess knowledge base.Interventions:- Provide teaching at level of understanding- Provide teaching via preferred learning methods  Outcome: Progressing

## 2025-03-26 NOTE — NURSING NOTE
Patient discharged to home with . IV removed and nurse reviewed discharge instructions with patient.  Patient packed all belongings wnet with patient who was escorted out via WC with staff.

## 2025-03-26 NOTE — ANESTHESIA POSTPROCEDURE EVALUATION
Post-Op Assessment Note    CV Status:  Stable    Pain management: adequate       Mental Status:  Awake   Hydration Status:  Euvolemic   PONV Controlled:  Controlled   Airway Patency:  Patent     Post Op Vitals Reviewed: Yes    No anethesia notable event occurred.    Staff: Anesthesiologist           Last Filed PACU Vitals:  Vitals Value Taken Time   Temp 97.4 °F (36.3 °C) 03/25/25 1931   Pulse 73 03/25/25 2009   /61 03/25/25 2000   Resp 16 03/25/25 2000   SpO2 97 % 03/25/25 2009   Vitals shown include unfiled device data.    Modified Antony:     Vitals Value Taken Time   Activity 2 03/25/25 2000   Respiration 2 03/25/25 2000   Circulation 2 03/25/25 2000   Consciousness 1 03/25/25 2000   Oxygen Saturation 1 03/25/25 2000     Modified Antony Score: 8

## 2025-03-28 PROCEDURE — 88307 TISSUE EXAM BY PATHOLOGIST: CPT | Performed by: STUDENT IN AN ORGANIZED HEALTH CARE EDUCATION/TRAINING PROGRAM

## 2025-03-28 PROCEDURE — 88305 TISSUE EXAM BY PATHOLOGIST: CPT | Performed by: STUDENT IN AN ORGANIZED HEALTH CARE EDUCATION/TRAINING PROGRAM

## 2025-03-28 PROCEDURE — 88112 CYTOPATH CELL ENHANCE TECH: CPT | Performed by: STUDENT IN AN ORGANIZED HEALTH CARE EDUCATION/TRAINING PROGRAM

## 2025-03-31 ENCOUNTER — RESULTS FOLLOW-UP (OUTPATIENT)
Dept: OBGYN CLINIC | Facility: CLINIC | Age: 50
End: 2025-03-31

## 2025-04-07 ENCOUNTER — OFFICE VISIT (OUTPATIENT)
Dept: OBGYN CLINIC | Facility: CLINIC | Age: 50
End: 2025-04-07

## 2025-04-07 VITALS
HEIGHT: 62 IN | BODY MASS INDEX: 23.55 KG/M2 | DIASTOLIC BLOOD PRESSURE: 70 MMHG | SYSTOLIC BLOOD PRESSURE: 120 MMHG | WEIGHT: 128 LBS

## 2025-04-07 DIAGNOSIS — Z90.721 HISTORY OF RIGHT SALPINGO-OOPHORECTOMY: Primary | ICD-10-CM

## 2025-04-07 DIAGNOSIS — Z90.79 HISTORY OF RIGHT SALPINGO-OOPHORECTOMY: Primary | ICD-10-CM

## 2025-04-07 PROCEDURE — 99024 POSTOP FOLLOW-UP VISIT: CPT | Performed by: STUDENT IN AN ORGANIZED HEALTH CARE EDUCATION/TRAINING PROGRAM

## 2025-04-07 NOTE — PROGRESS NOTES
[facility]  Post-Op Visit  Masha Shaikh MD  04/07/25      Assessment   Patient is a 50 y.o. y.o. female who is 2 weeks s/p laparoscopic RSO and left fimbriectomy with FLASH who is steadily healing post-operatively.  Operative findings again reviewed.   OR photos reviewed.   Pathology report discussed- benign endometrioma.      Plan    1. Pain management PRN: motrin/ tylenol  2. Wound care discussed.  3. Activity restrictions: continued lifting precautions given pain   4. Anticipated return to work: recommend extending leave given her work for an additional 2 weeks  5. We reviewed her diagnosis of endometriosis including the risks of endometriosis and recommendations for suppression therapy which can be completed with hormonal medications such as combined oral contraceptive pills, Depo provera, [place], Depo Lupron. She will consider her options and RTO to discuss.  6. RTO in 2 weeks for follow up and further discussion on suppressive therapy    Subjective    Marti Sethi is a 50 y.o. y.o. female who presents to the office 2 weeks status post  Laparoscopic RSO and left fimbriectomy and FLASH  for adnexal mass and pelvic adhesions secondary to endometriosis. She is eating a regular diet without difficulty. Urination and bowel movements are normal. She notes pain in certain positions particularly in the right side- she is not taking any pain medications.     Patient has no known allergies.    Current Outpatient Medications:     Calcium Carb-Cholecalciferol (Calcium 1000 + D) 1000-800 MG-UNIT TABS, , Disp: , Rfl:     cetirizine (ZyrTEC Allergy) 10 mg tablet, , Disp: , Rfl:     Cholecalciferol (VITAMIN D) 2000 units CAPS, Take by mouth daily, Disp: , Rfl:     clobetasol (TEMOVATE) 0.05 % cream, Apply topically 2 (two) times a day Short term, hands, Disp: 180 g, Rfl: 3    ibuprofen (MOTRIN) 600 mg tablet, Take 1 tablet (600 mg total) by mouth every 6 (six) hours as needed for mild pain, Disp: 30  "tablet, Rfl: 0    Multiple Vitamin (MULTI-VITAMIN DAILY) TABS, Take by mouth, Disp: , Rfl:       Review of Systems  Denies Fevers/chills/N/V/Constipation/Vaginal bleeding/excessive pain/dysuria/frequency of urination. Also as noted in HPI.     PMH/ PSH/ SH/ PFH/ allergies and medications were reviewed and updated during this visit.     Objective   /70 (BP Location: Left arm, Patient Position: Sitting)   Ht 5' 2\" (1.575 m)   Wt 58.1 kg (128 lb)   LMP 03/27/2025 (Approximate)   BMI 23.41 kg/m²   Physical Exam  Vitals reviewed.   Constitutional:       Appearance: Normal appearance. She is not ill-appearing or diaphoretic.   HENT:      Head: Normocephalic and atraumatic.   Cardiovascular:      Rate and Rhythm: Normal rate.   Pulmonary:      Effort: Pulmonary effort is normal. No respiratory distress.   Abdominal:      Palpations: Abdomen is soft.      Tenderness: There is no abdominal tenderness. There is no guarding or rebound.      Comments: Incisions C/D/I   Musculoskeletal:      Right lower leg: No edema.      Left lower leg: No edema.   Skin:     General: Skin is warm and dry.   Neurological:      Mental Status: She is alert and oriented to person, place, and time.   Psychiatric:         Mood and Affect: Mood normal.         Behavior: Behavior normal.           "

## 2025-04-10 DIAGNOSIS — Z01.84 IMMUNITY TO MEASLES DETERMINED BY SEROLOGIC TEST: Primary | ICD-10-CM

## 2025-04-21 ENCOUNTER — OFFICE VISIT (OUTPATIENT)
Dept: OBGYN CLINIC | Facility: CLINIC | Age: 50
End: 2025-04-21

## 2025-04-21 VITALS
HEIGHT: 62 IN | WEIGHT: 126 LBS | BODY MASS INDEX: 23.19 KG/M2 | SYSTOLIC BLOOD PRESSURE: 120 MMHG | DIASTOLIC BLOOD PRESSURE: 74 MMHG

## 2025-04-21 DIAGNOSIS — N80.9 ENDOMETRIOSIS: ICD-10-CM

## 2025-04-21 DIAGNOSIS — Z90.721 S/P RIGHT OOPHORECTOMY: Primary | ICD-10-CM

## 2025-04-21 PROCEDURE — 99024 POSTOP FOLLOW-UP VISIT: CPT | Performed by: STUDENT IN AN ORGANIZED HEALTH CARE EDUCATION/TRAINING PROGRAM

## 2025-04-21 RX ORDER — NORETHINDRONE ACETATE AND ETHINYL ESTRADIOL, ETHINYL ESTRADIOL AND FERROUS FUMARATE 1MG-10(24)
1 KIT ORAL DAILY
Qty: 28 TABLET | Refills: 3 | Status: SHIPPED | OUTPATIENT
Start: 2025-04-21 | End: 2025-04-21 | Stop reason: SDUPTHER

## 2025-04-21 RX ORDER — NORETHINDRONE ACETATE AND ETHINYL ESTRADIOL, ETHINYL ESTRADIOL AND FERROUS FUMARATE 1MG-10(24)
1 KIT ORAL DAILY
Qty: 28 TABLET | Refills: 3 | Status: SHIPPED | OUTPATIENT
Start: 2025-04-21

## 2025-04-21 NOTE — PROGRESS NOTES
"[facility]  Post-Op Visit  Masha Shaikh MD  04/21/25      Assessment   Patient is a 50 y.o. y.o. female who is 4 weeks post op s/p lap RO and FLASH who is doing well post-operatively.     Plan    1. Pain management PRN: motrin/ tylenol PRN  2. Wound care discussed.  3. Endometriosis- reviewed medical therapy options to help suppress endometriosis- we discussed risks and benefits of each and at this time will plan to proceed with low dose OCP.   4. Anticipated return to work: May return to work without restrictions- work note given.  5. Follow up: 3 months for pill check.    Subjective    Marti Sethi is a 50 y.o. y.o. female who presents to the office 4 weeks status post laparoscopic RO and FLASH for adnexal mass. She is eating a regular diet without difficulty. Urination and bowel movements are normal. Pain is under control and improved from lat visit- she is taking motrin occasionally.    Patient has no known allergies.    Current Outpatient Medications:     Calcium Carb-Cholecalciferol (Calcium 1000 + D) 1000-800 MG-UNIT TABS, , Disp: , Rfl:     cetirizine (ZyrTEC Allergy) 10 mg tablet, , Disp: , Rfl:     Cholecalciferol (VITAMIN D) 2000 units CAPS, Take by mouth daily, Disp: , Rfl:     clobetasol (TEMOVATE) 0.05 % cream, Apply topically 2 (two) times a day Short term, hands, Disp: 180 g, Rfl: 3    ibuprofen (MOTRIN) 600 mg tablet, Take 1 tablet (600 mg total) by mouth every 6 (six) hours as needed for mild pain, Disp: 30 tablet, Rfl: 0    Multiple Vitamin (MULTI-VITAMIN DAILY) TABS, Take by mouth, Disp: , Rfl:       Review of Systems  Denies Fevers/chills/N/V/Constipation/Vaginal bleeding/excessive pain/dysuria/frequency of urination. Also as noted in HPI.     PMH/ PSH/ SH/ PFH/ allergies and medications were reviewed and updated during this visit.     Objective   Ht 5' 2\" (1.575 m)   Wt 57.2 kg (126 lb)   LMP 04/20/2025 (Exact Date)   BMI 23.05 kg/m²   Physical Exam  Vitals reviewed. "   Constitutional:       Appearance: Normal appearance. She is not ill-appearing or diaphoretic.   HENT:      Head: Normocephalic and atraumatic.   Cardiovascular:      Rate and Rhythm: Normal rate.   Pulmonary:      Effort: Pulmonary effort is normal. No respiratory distress.   Abdominal:      Palpations: Abdomen is soft.      Tenderness: There is no abdominal tenderness. There is no guarding or rebound.      Comments: Incision well approximated and intact   Musculoskeletal:      Right lower leg: No edema.      Left lower leg: No edema.   Skin:     General: Skin is warm and dry.   Neurological:      Mental Status: She is alert and oriented to person, place, and time.   Psychiatric:         Mood and Affect: Mood normal.         Behavior: Behavior normal.

## 2025-04-21 NOTE — LETTER
April 21, 2025     Patient: Marti Sethi  YOB: 1975  Date of Visit: 4/21/2025      To Whom it May Concern:    Marti Sethi is under my professional care. Marti was seen in my office on 4/21/2025. Marti may return to work on 4/28/2025 without restrictions .    If you have any questions or concerns, please don't hesitate to call.         Sincerely,          Masha Shaikh MD        CC: No Recipients

## 2025-05-01 ENCOUNTER — PATIENT MESSAGE (OUTPATIENT)
Dept: OBGYN CLINIC | Facility: CLINIC | Age: 50
End: 2025-05-01

## 2025-05-01 NOTE — PATIENT COMMUNICATION
Patient called in to see if Dr could review her message, and look at images. Reviewed the attached images, and discusses signs and symptoms of blood clot with patient. Patient denies pain in the leg with ambulation or movement, negative homans sign. No discomfort in calf area or thigh. She states the area is not swollen or raised, just looks like a bruise. She doesn't remember bumping it, but it is possible she may have during her sleep. Patient reassured and decided to continue to monitor at home, and canceled her appointment to be seen. Advised patient she may reach out with any additional concerns, or check in with PCP if bruising worsens.

## 2025-05-12 ENCOUNTER — TELEPHONE (OUTPATIENT)
Age: 50
End: 2025-05-12

## 2025-05-12 ENCOUNTER — HOSPITAL ENCOUNTER (EMERGENCY)
Facility: HOSPITAL | Age: 50
Discharge: HOME | End: 2025-05-12
Attending: EMERGENCY MEDICINE
Payer: COMMERCIAL

## 2025-05-12 VITALS
OXYGEN SATURATION: 100 % | DIASTOLIC BLOOD PRESSURE: 65 MMHG | SYSTOLIC BLOOD PRESSURE: 140 MMHG | HEART RATE: 83 BPM | TEMPERATURE: 98.4 F | RESPIRATION RATE: 18 BRPM

## 2025-05-12 DIAGNOSIS — R22.0 FACIAL SWELLING: ICD-10-CM

## 2025-05-12 DIAGNOSIS — T78.40XA ALLERGIC REACTION, INITIAL ENCOUNTER: ICD-10-CM

## 2025-05-12 DIAGNOSIS — J30.2 SEASONAL ALLERGIES: Primary | ICD-10-CM

## 2025-05-12 DIAGNOSIS — T78.40XD ALLERGIC REACTION, SUBSEQUENT ENCOUNTER: Primary | ICD-10-CM

## 2025-05-12 PROCEDURE — 99283 EMERGENCY DEPT VISIT LOW MDM: CPT

## 2025-05-12 PROCEDURE — 99284 EMERGENCY DEPT VISIT MOD MDM: CPT | Performed by: PHYSICIAN ASSISTANT

## 2025-05-12 RX ORDER — PREDNISONE 20 MG/1
60 TABLET ORAL ONCE
Status: COMPLETED | OUTPATIENT
Start: 2025-05-12 | End: 2025-05-12

## 2025-05-12 RX ORDER — PREDNISONE 20 MG/1
TABLET ORAL
Qty: 20 TABLET | Refills: 0 | Status: SHIPPED | OUTPATIENT
Start: 2025-05-12

## 2025-05-12 RX ORDER — HYDROXYZINE HYDROCHLORIDE 25 MG/1
25 TABLET, FILM COATED ORAL ONCE
Status: COMPLETED | OUTPATIENT
Start: 2025-05-12 | End: 2025-05-12

## 2025-05-12 RX ORDER — HYDROXYZINE HYDROCHLORIDE 25 MG/1
25 TABLET, FILM COATED ORAL EVERY 6 HOURS PRN
Qty: 12 TABLET | Refills: 0 | Status: SHIPPED | OUTPATIENT
Start: 2025-05-12

## 2025-05-12 RX ADMIN — HYDROXYZINE HYDROCHLORIDE 25 MG: 25 TABLET, FILM COATED ORAL at 07:41

## 2025-05-12 RX ADMIN — PREDNISONE 60 MG: 20 TABLET ORAL at 07:41

## 2025-05-12 NOTE — TELEPHONE ENCOUNTER
Patient's called and is scheduled for a physical on 7/7.  They will be doing labs for [facility].  She would like to know if they can also have orders for any other labs needed so they can also have them drawn before the visit.  Please advise.  Thank you!

## 2025-05-12 NOTE — Clinical Note
Marti Sethi was seen and treated in our emergency department on 5/12/2025.                Diagnosis: Seen emergency department    Marti  .    She may return on this date: 05/13/2025         If you have any questions or concerns, please don't hesitate to call.      Christiano Go PA-C    ______________________________           _______________          _______________  Hospital Representative                              Date                                Time

## 2025-05-12 NOTE — DISCHARGE INSTRUCTIONS
Do not take Zyrtec while on Atarax    Return with any worsening symptoms questions comments or concerns    Follow-up with your family doctor for ongoing care on re-evaluation

## 2025-05-12 NOTE — ED PROVIDER NOTES
Time reflects when diagnosis was documented in both MDM as applicable and the Disposition within this note       Time User Action Codes Description Comment    5/12/2025  7:37 AM Christiano Chaudhari [J30.2] Seasonal allergies     5/12/2025  7:40 AM Christiano Chaudhari [T78.40XA] Allergic reaction, initial encounter           ED Disposition       ED Disposition   Discharge    Condition   Stable    Date/Time   Mon May 12, 2025  7:36 AM    Comment   Marti Sethi discharge to home/self care.                   Assessment & Plan       Medical Decision Making  Is a 50-year-old female patient who presents with itchy eyes and some puffiness and itchiness over her eyelids and face without rash that started yesterday there was no prodrome or event where she believes this started from.  She has no systemic symptoms.  Differential diagnose include not limited to contact dermatitis, seasonal allergy, allergic reaction less likely    Problems Addressed:  Allergic reaction, initial encounter: acute illness or injury     Details: Given steroids and Atarax.  There is no rashes some puffiness around the eyes and some tearing she describes itchiness.  Seasonal allergies: acute illness or injury     Details: Advised to hold her Zyrtec and start Atarax    Amount and/or Complexity of Data Reviewed  Discussion of management or test interpretation with external provider(s): Using joint decision-making patient be discharged on prednisone and Atarax follow-up with PCP return with any worsening symptoms questions comments or concerns she verbalized understanding and agreement    Risk  Prescription drug management.             Medications   hydrOXYzine HCL (ATARAX) tablet 25 mg (25 mg Oral Given 5/12/25 0741)   predniSONE tablet 60 mg (60 mg Oral Given 5/12/25 0741)       ED Risk Strat Scores                    No data recorded        SBIRT 20yo+      Flowsheet Row Most Recent Value   Initial Alcohol Screen: US AUDIT-C     1. How often  do you have a drink containing alcohol? 0 Filed at: 05/12/2025 0720   Audit-C Score 0 Filed at: 05/12/2025 0720                            History of Present Illness       Chief Complaint   Patient presents with    Facial Swelling     Bilateral eye lid swelling since yesterday.       Past Medical History:   Diagnosis Date    History of closed fracture 07/24/2002    Navicular bone of foot    Hyperlipidemia 12/17/2013    resolved 5 13 2015      Past Surgical History:   Procedure Laterality Date    COLONOSCOPY      OR LAPAROSCOPY W/RMVL ADNEXAL STRUCTURES Bilateral 3/25/2025    Procedure: LAPAROSCOPIC RIGHT SALPINGO-OOPHORECTOMY, LEFT FIMBRIECTOMY, LYSIS OF ADHESIONS, EXAM UNDER ANESTHESIA;  Surgeon: Masha Shaikh MD;  Location: [facility];  Service: Gynecology    WISDOM TOOTH EXTRACTION Bilateral       Family History   Problem Relation Age of Onset    Diabetes Mother     Hypertension Mother     Hypertension Father     Cancer Maternal Uncle       Social History     Tobacco Use    Smoking status: Never     Passive exposure: Past    Smokeless tobacco: Never   Vaping Use    Vaping status: Never Used   Substance Use Topics    Alcohol use: Yes     Alcohol/week: 2.0 standard drinks of alcohol     Types: 2 Cans of beer per week     Comment: Occasional    Drug use: Never      E-Cigarette/Vaping    E-Cigarette Use Never User       E-Cigarette/Vaping Substances    Nicotine No     THC No     CBD No     Flavoring No     Other No     Unknown No       I have reviewed and agree with the history as documented.     This is a 50-year-old female patient who presents with 1 day history of puffy eyes and itchy eyes.  She cannot relate this from something she did.  She did work in the garden but she had this itching and puffy eyes before she did eat shrimp but she had this before.  Had similar symptoms December of last year was given steroids a short course at first and then it returned and then was given a longer course and it  resolved.  She does take Zyrtec on a daily basis.  No blurred vision no double vision no photophobia.  No difficulty breathing.  Her eyes been itchy and tearing without pain.  No headache cough congestion or sore throat no chest pain or shortness of breath nothing makes this better or worse        Review of Systems   Constitutional:  Negative for diaphoresis, fatigue and fever.   HENT:  Negative for congestion, ear pain, nosebleeds and sore throat.    Eyes:  Positive for itching. Negative for photophobia, pain, discharge, redness and visual disturbance.   Respiratory:  Negative for cough, choking, chest tightness, shortness of breath and wheezing.    Cardiovascular:  Negative for chest pain and palpitations.   Gastrointestinal:  Negative for abdominal distention, abdominal pain, diarrhea and vomiting.   Genitourinary:  Negative for dysuria, flank pain and frequency.   Musculoskeletal:  Negative for back pain, gait problem and joint swelling.   Skin:  Negative for color change and rash.   Neurological:  Negative for dizziness, syncope and headaches.   Psychiatric/Behavioral:  Negative for behavioral problems and confusion. The patient is not nervous/anxious.    All other systems reviewed and are negative.          Objective       ED Triage Vitals [05/12/25 0718]   Temperature Pulse Blood Pressure Respirations SpO2 Patient Position - Orthostatic VS   98.4 °F (36.9 °C) 83 140/65 18 100 % Lying      Temp Source Heart Rate Source BP Location FiO2 (%) Pain Score    Oral Monitor Right arm -- --      Vitals      Date and Time Temp Pulse SpO2 Resp BP Pain Score FACES Pain Rating User   05/12/25 0718 98.4 °F (36.9 °C) 83 100 % 18 140/65 -- -- OE            Physical Exam  Vitals and nursing note reviewed.   Constitutional:       General: She is not in acute distress.     Appearance: Normal appearance. She is not ill-appearing, toxic-appearing or diaphoretic.   HENT:      Head: Normocephalic and atraumatic.      Right Ear:  Tympanic membrane, ear canal and external ear normal.      Left Ear: Tympanic membrane, ear canal and external ear normal.      Nose: Nose normal. No congestion or rhinorrhea.      Mouth/Throat:      Mouth: Mucous membranes are dry.      Pharynx: Oropharynx is clear. No oropharyngeal exudate or posterior oropharyngeal erythema.   Eyes:      General: Vision grossly intact. Gaze aligned appropriately. No allergic shiner, visual field deficit or scleral icterus.        Right eye: No foreign body, discharge or hordeolum.         Left eye: No foreign body, discharge or hordeolum.      Extraocular Movements: Extraocular movements intact.      Conjunctiva/sclera: Conjunctivae normal.      Pupils: Pupils are equal, round, and reactive to light.      Comments: Patient has mildly puffy eyelids bilaterally without rash or sign of infection no erythema.  Both eyes appear teary no purulent discharge   Cardiovascular:      Rate and Rhythm: Normal rate and regular rhythm.   Pulmonary:      Effort: Pulmonary effort is normal. No respiratory distress.      Breath sounds: Normal breath sounds.   Abdominal:      General: Bowel sounds are normal.      Palpations: Abdomen is soft.      Tenderness: There is no abdominal tenderness.   Musculoskeletal:         General: Normal range of motion.      Cervical back: Normal range of motion and neck supple. No rigidity or tenderness.      Right lower leg: No edema.      Left lower leg: No edema.   Lymphadenopathy:      Cervical: No cervical adenopathy.   Skin:     General: Skin is warm and dry.      Capillary Refill: Capillary refill takes less than 2 seconds.      Findings: No rash.   Neurological:      General: No focal deficit present.      Mental Status: She is alert and oriented to person, place, and time. Mental status is at baseline.   Psychiatric:         Mood and Affect: Mood normal.         Behavior: Behavior normal.         Results Reviewed       None            No orders to display        Procedures    ED Medication and Procedure Management   Prior to Admission Medications   Prescriptions Last Dose Informant Patient Reported? Taking?   Calcium Carb-Cholecalciferol (Calcium 1000 + D) 1000-800 MG-UNIT TABS   Yes No   Cholecalciferol (VITAMIN D) 2000 units CAPS   Yes No   Sig: Take by mouth daily   Multiple Vitamin (MULTI-VITAMIN DAILY) TABS   Yes No   Sig: Take by mouth   Norethin-Eth Estrad-Fe Biphas (Lo Loestrin Fe) 1 MG-10 MCG / 10 MCG TABS   No No   Sig: Take 1 tablet by mouth in the morning   clobetasol (TEMOVATE) 0.05 % cream   No No   Sig: Apply topically 2 (two) times a day Short term, hands   ibuprofen (MOTRIN) 600 mg tablet   No No   Sig: Take 1 tablet (600 mg total) by mouth every 6 (six) hours as needed for mild pain      Facility-Administered Medications: None     Patient's Medications   Discharge Prescriptions    HYDROXYZINE HCL (ATARAX) 25 MG TABLET    Take 1 tablet (25 mg total) by mouth every 6 (six) hours as needed for itching (Hold Zyrtec while on Atarax)       Start Date: 5/12/2025 End Date: --       Order Dose: 25 mg       Quantity: 12 tablet    Refills: 0    PREDNISONE 20 MG TABLET    50 [id number], [id number] start tomorrow       Start Date: 5/12/2025 End Date: --       Order Dose: --       Quantity: 20 tablet    Refills: 0     No discharge procedures on file.  ED SEPSIS DOCUMENTATION   Time reflects when diagnosis was documented in both MDM as applicable and the Disposition within this note       Time User Action Codes Description Comment    5/12/2025  7:37 AM Christiano Go [J30.2] Seasonal allergies     5/12/2025  7:40 AM Christiano Go [T78.40XA] Allergic reaction, initial encounter                  Christiano Go PA-C  05/12/25 0741       Christiano Go PA-C  05/12/25 0741

## 2025-05-15 DIAGNOSIS — Z13.1 SCREENING FOR DIABETES MELLITUS (DM): Primary | ICD-10-CM

## 2025-05-20 ENCOUNTER — NURSE TRIAGE (OUTPATIENT)
Age: 50
End: 2025-05-20

## 2025-05-20 NOTE — TELEPHONE ENCOUNTER
Call made to patient, reviewed provider recommendations. Patient verbalized understanding.     Patient will wait to discuss further with Dr. MANTILLA at her upcoming visit in July.    Will call back if she changed her mind or decides what treatment she would like to try.

## 2025-05-20 NOTE — TELEPHONE ENCOUNTER
"FOLLOW UP: Discuss with provider and call patient back. Patient will call back with elevated BP or additional symptoms.     REASON FOR CONVERSATION: Hypertension    SYMPTOMS: Elevated blood pressure 5/15: 150/90 and had headache, dizziness. BP WNL now.     OTHER: Patient started OCP about 3 weeks ago. Patient had headache last Thursday 5/15 and took BP: 150/90. Patient monitors BP daily. Also feels dizziness that comes and goes. BP today is normal - 123/80. Patient unsure if elevated could be due to ocp. Is not on BP medication. Routing to provider for review.     DISPOSITION: Discuss with Provider and Call Back Patient (overriding See Within 2 Weeks in Office)      Reason for Disposition   Systolic BP >= 130 OR Diastolic >= 80, and is not taking BP medications    Answer Assessment - Initial Assessment Questions  1. BLOOD PRESSURE: \"What is your blood pressure?\" \"Did you take at least two measurements 5 minutes apart?\"      BP Thursday 5/15: 150/90 and had headache at this time. Today BP is 123/80. Patient monitors daily.  2. ONSET: \"When did you take your blood pressure?\"      See above  4. HISTORY: \"Do you have a history of high blood pressure?\"      No history  5. MEDICINES: \"Are you taking any medicines for blood pressure?\" \"Have you missed any doses recently?\"      Not in medication list  6. OTHER SYMPTOMS: \"Do you have any symptoms?\" (e.g., blurred vision, chest pain, difficulty breathing, headache, weakness)      Headache at time of elevated BP  7. PREGNANCY: \"Is there any chance you are pregnant?\" \"When was your last menstrual period?\"      Is on ocp and has had vaginal spotting since 5/12    Protocols used: Blood Pressure - High-Adult-[place]    "

## 2025-05-20 NOTE — TELEPHONE ENCOUNTER
"Dr. Shaikh out of office. Routed to on call, Dr. Willett with additional information received through my chart message at 9:45 am     Copied from my chart message:  \"Hi Good morning! So I had talked to Nurse Ban this morning about the side effects of the OCP that I'm taking. Last Thursday 5/15 I had a very bad headache and I felt dizzy. My BP was 150/90 at that time. I've been monitoring it everyday. I still have headaches and dizziness on and off. And also I've been spotting since 5/12 until now. Also I had a severe allergic reaction/ I had B/L edema around my eyes I don't know what causing it but I had this allergic reaction/angioedema last December also. I have to go to the ED last Monday 5/12 and they treated me with steroids and Atarax. So please can you advise me what to do. Thanks. \"    Portal message sent to hold OCP until further review by provider .   "

## 2025-05-22 DIAGNOSIS — Z01.84 IMMUNITY TO MUMPS DETERMINED BY SEROLOGIC TEST: ICD-10-CM

## 2025-05-22 DIAGNOSIS — Z01.84 IMMUNITY TO VARICELLA DETERMINED BY SEROLOGIC TEST: Primary | ICD-10-CM

## 2025-05-22 DIAGNOSIS — Z01.84 IMMUNITY TO RUBELLA DETERMINED BY SEROLOGIC TEST: ICD-10-CM

## 2025-05-28 ENCOUNTER — APPOINTMENT (OUTPATIENT)
Dept: LAB | Facility: HOSPITAL | Age: 50
End: 2025-05-28
Attending: PREVENTIVE MEDICINE
Payer: COMMERCIAL

## 2025-05-28 ENCOUNTER — APPOINTMENT (OUTPATIENT)
Dept: LAB | Facility: HOSPITAL | Age: 50
End: 2025-05-28
Attending: PHYSICIAN ASSISTANT
Payer: COMMERCIAL

## 2025-05-28 ENCOUNTER — APPOINTMENT (OUTPATIENT)
Dept: LAB | Facility: HOSPITAL | Age: 50
End: 2025-05-28
Attending: ALLERGY & IMMUNOLOGY
Payer: COMMERCIAL

## 2025-05-28 DIAGNOSIS — T78.3XXD ANGIOEDEMA, SUBSEQUENT ENCOUNTER: ICD-10-CM

## 2025-05-28 DIAGNOSIS — Z01.84 IMMUNITY TO RUBELLA DETERMINED BY SEROLOGIC TEST: ICD-10-CM

## 2025-05-28 DIAGNOSIS — Z01.84 IMMUNITY TO MUMPS DETERMINED BY SEROLOGIC TEST: ICD-10-CM

## 2025-05-28 DIAGNOSIS — Z01.84 IMMUNITY TO MEASLES DETERMINED BY SEROLOGIC TEST: ICD-10-CM

## 2025-05-28 DIAGNOSIS — Z00.8 HEALTH EXAMINATION IN POPULATION SURVEY: ICD-10-CM

## 2025-05-28 DIAGNOSIS — Z01.84 IMMUNITY TO VARICELLA DETERMINED BY SEROLOGIC TEST: ICD-10-CM

## 2025-05-28 LAB
C4 SERPL-MCNC: 31 MG/DL (ref 19–52)
CHOLEST SERPL-MCNC: 245 MG/DL (ref ?–200)
EST. AVERAGE GLUCOSE BLD GHB EST-MCNC: 105 MG/DL
HBA1C MFR BLD: 5.3 %
HDLC SERPL-MCNC: 52 MG/DL
LDLC SERPL CALC-MCNC: 142 MG/DL (ref 0–100)
MEV IGG SER QL IA: 297 AU/ML
MEV IGG SER QL IA: POSITIVE
MUV IGG SER QL IA: 36.7 AU/ML
MUV IGG SER QL IA: POSITIVE
NONHDLC SERPL-MCNC: 193 MG/DL
RUBV IGG SERPL IA-ACNC: 56.7 IU/ML
TRIGL SERPL-MCNC: 257 MG/DL (ref ?–150)
VZV IGG SER QL IA: 2.39 S/CO
VZV IGG SER QL IA: POSITIVE

## 2025-05-28 PROCEDURE — 86161 COMPLEMENT/FUNCTION ACTIVITY: CPT

## 2025-05-28 PROCEDURE — 80061 LIPID PANEL: CPT

## 2025-05-28 PROCEDURE — 86765 RUBEOLA ANTIBODY: CPT

## 2025-05-28 PROCEDURE — 86160 COMPLEMENT ANTIGEN: CPT

## 2025-05-28 PROCEDURE — 86787 VARICELLA-ZOSTER ANTIBODY: CPT

## 2025-05-28 PROCEDURE — 86762 RUBELLA ANTIBODY: CPT

## 2025-05-28 PROCEDURE — 83036 HEMOGLOBIN GLYCOSYLATED A1C: CPT

## 2025-05-28 PROCEDURE — 36415 COLL VENOUS BLD VENIPUNCTURE: CPT

## 2025-05-28 PROCEDURE — 86162 COMPLEMENT TOTAL (CH50): CPT

## 2025-05-28 PROCEDURE — 86735 MUMPS ANTIBODY: CPT

## 2025-05-29 LAB — CH50 SERPL-ACNC: 44 U/ML

## 2025-05-30 LAB
C1INH ACT/NOR SERPL: 97 %MEAN NORMAL
C1INH SERPL-MCNC: 31 MG/DL (ref 21–39)

## 2025-06-04 LAB — C1Q SERPL-MCNC: 15.3 MG/DL (ref 10.3–20.5)

## 2025-07-02 ENCOUNTER — APPOINTMENT (OUTPATIENT)
Dept: LAB | Facility: CLINIC | Age: 50
End: 2025-07-02
Payer: COMMERCIAL

## 2025-07-02 DIAGNOSIS — Z13.1 SCREENING FOR DIABETES MELLITUS (DM): ICD-10-CM

## 2025-07-02 LAB
ALBUMIN SERPL BCG-MCNC: 4.1 G/DL (ref 3.5–5)
ALP SERPL-CCNC: 46 U/L (ref 34–104)
ALT SERPL W P-5'-P-CCNC: 10 U/L (ref 7–52)
ANION GAP SERPL CALCULATED.3IONS-SCNC: 7 MMOL/L (ref 4–13)
AST SERPL W P-5'-P-CCNC: 16 U/L (ref 13–39)
BILIRUB SERPL-MCNC: 0.51 MG/DL (ref 0.2–1)
BUN SERPL-MCNC: 11 MG/DL (ref 5–25)
CALCIUM SERPL-MCNC: 9 MG/DL (ref 8.4–10.2)
CHLORIDE SERPL-SCNC: 102 MMOL/L (ref 96–108)
CO2 SERPL-SCNC: 28 MMOL/L (ref 21–32)
CREAT SERPL-MCNC: 0.68 MG/DL (ref 0.6–1.3)
GFR SERPL CREATININE-BSD FRML MDRD: 103 ML/MIN/1.73SQ M
GLUCOSE P FAST SERPL-MCNC: 100 MG/DL (ref 65–99)
POTASSIUM SERPL-SCNC: 4.2 MMOL/L (ref 3.5–5.3)
PROT SERPL-MCNC: 6.6 G/DL (ref 6.4–8.4)
SODIUM SERPL-SCNC: 137 MMOL/L (ref 135–147)

## 2025-07-02 PROCEDURE — 80053 COMPREHEN METABOLIC PANEL: CPT

## 2025-07-02 PROCEDURE — 36415 COLL VENOUS BLD VENIPUNCTURE: CPT

## 2025-07-06 PROBLEM — D75.A G6PD DEFICIENCY: Status: ACTIVE | Noted: 2025-07-06

## 2025-07-06 PROBLEM — N80.9 ENDOMETRIOSIS: Status: ACTIVE | Noted: 2025-07-06

## 2025-07-06 RX ORDER — CETIRIZINE HYDROCHLORIDE 10 MG/1
TABLET ORAL
COMMUNITY

## 2025-07-06 RX ORDER — FAMOTIDINE 20 MG/1
TABLET, FILM COATED ORAL
COMMUNITY
Start: 2025-05-12 | End: 2025-07-07

## 2025-07-07 ENCOUNTER — OFFICE VISIT (OUTPATIENT)
Dept: FAMILY MEDICINE CLINIC | Facility: CLINIC | Age: 50
End: 2025-07-07
Payer: COMMERCIAL

## 2025-07-07 VITALS
BODY MASS INDEX: 23.86 KG/M2 | HEIGHT: 61 IN | TEMPERATURE: 97.7 F | WEIGHT: 126.4 LBS | DIASTOLIC BLOOD PRESSURE: 78 MMHG | RESPIRATION RATE: 18 BRPM | SYSTOLIC BLOOD PRESSURE: 110 MMHG | HEART RATE: 86 BPM

## 2025-07-07 DIAGNOSIS — J31.0 CHRONIC RHINITIS: ICD-10-CM

## 2025-07-07 DIAGNOSIS — T78.3XXD ANGIOEDEMA, SUBSEQUENT ENCOUNTER: ICD-10-CM

## 2025-07-07 DIAGNOSIS — Z00.00 ANNUAL PHYSICAL EXAM: Primary | ICD-10-CM

## 2025-07-07 DIAGNOSIS — Z00.00 HEALTHCARE MAINTENANCE: ICD-10-CM

## 2025-07-07 DIAGNOSIS — N80.9 ENDOMETRIOSIS: ICD-10-CM

## 2025-07-07 PROBLEM — T78.3XXA ANGIO-EDEMA: Status: ACTIVE | Noted: 2025-07-07

## 2025-07-07 PROCEDURE — 99396 PREV VISIT EST AGE 40-64: CPT | Performed by: FAMILY MEDICINE

## 2025-07-07 NOTE — PROGRESS NOTES
Adult Annual Physical  Name: Marti Sethi      : 1975      MRN: [id number]  Encounter Provider: Rogelio Flores DO  Encounter Date: 2025   Encounter department: [facility]    :  Assessment & Plan  Angioedema, subsequent encounter  Been to allergist  Trigger unknown   On zyrtec  Offer seafood rast  Orders:    Allergen Shellfish Panel; Future    Healthcare maintenance  Updated  Fbs 100, prediabetes advised       Endometriosis         Chronic rhinitis             Preventive Screenings:    - Cervical cancer screening: screening up-to-date   - Breast cancer screening: screening up-to-date          History of Present Illness   Mom 77  Dad rectal CA 77    No exercise  Plans to walk more  Has treadmill but not using it  More veggies  Less rice  Drinking water, 1L     Still having periods  Takes D 4000u/d  Labs reviewed  fbs 100  Seafood allergy?  Dx with angioedema?  2 times  On zyrtec 10mg bid      Adult Annual Physical:  Patient presents for annual physical.     Diet and Physical Activity:  - Diet/Nutrition: well balanced diet.  - Exercise: walking and 1-2 times a week on average.    Depression Screening:  - PHQ-2 Score: 0    General Health:  - Sleep: sleeps well.  - Hearing: normal hearing right ear and normal hearing left ear.  - Vision: most recent eye exam < 1 year ago.  - Dental: regular dental visits.    /GYN Health:  - Follows with GYN: yes.   - Menopause: premenopausal.   - Last menstrual cycle: 2025.   - History of STDs: no  - Contraception: barrier methods.      Advanced Care Planning:  - Has an advanced directive?: no    - Has a durable medical POA?: no      Review of Systems   Constitutional:  Negative for chills and fever.       Family History[1] was reviewed  Social History[2] was reviewed    Current Outpatient Medications   Medication Instructions    Calcium Carb-Cholecalciferol (Calcium 1000 + D) 1000-800 MG-UNIT TABS No dose, route, or frequency recorded.     "cetirizine (ZyrTEC Allergy) 10 mg tablet Twice daily    Cholecalciferol (VITAMIN D) 2000 units CAPS Daily    clobetasol (TEMOVATE) 0.05 % cream Topical, 2 times daily, Short term, hands    EPINEPHrine (EPIPEN) 0.3 mg, Intramuscular, As needed    hydrOXYzine HCL (ATARAX) 25 mg, Oral, Every 6 hours PRN    ibuprofen (MOTRIN) 600 mg, Oral, Every 6 hours PRN     >>>>>>>>  Return if symptoms worsen or fail to improve.  >>>>>>>>    [POCT]  No results found for this or any previous visit (from the past 24 hours).    Visit Vitals  /78   Pulse 86   Temp 97.7 °F (36.5 °C)   Resp 18   Ht 5' 1\" (1.549 m)   Wt 57.3 kg (126 lb 6.4 oz)   LMP 06/12/2025   BMI 23.88 kg/m²   OB Status Perimenopausal   Smoking Status Never   BSA 1.55 m²        Objective   /78   Pulse 86   Temp 97.7 °F (36.5 °C)   Resp 18   Ht 5' 1\" (1.549 m)   Wt 57.3 kg (126 lb 6.4 oz)   LMP 06/12/2025   BMI 23.88 kg/m²     Physical Exam  Vitals and nursing note reviewed.   Constitutional:       General: She is not in acute distress.     Appearance: She is well-developed. She is not ill-appearing.   HENT:      Head: Normocephalic.      Right Ear: Tympanic membrane and ear canal normal. There is no impacted cerumen.      Left Ear: Tympanic membrane and ear canal normal. There is no impacted cerumen.      Nose: No congestion.      Mouth/Throat:      Mouth: Mucous membranes are moist.     Eyes:      General: No scleral icterus.     Conjunctiva/sclera: Conjunctivae normal.     Neck:      Vascular: No carotid bruit.     Cardiovascular:      Rate and Rhythm: Normal rate and regular rhythm.      Heart sounds: No murmur heard.  Pulmonary:      Effort: Pulmonary effort is normal. No respiratory distress.      Breath sounds: No wheezing.   Abdominal:      General: There is no distension.      Palpations: Abdomen is soft.      Tenderness: There is no abdominal tenderness.     Musculoskeletal:         General: No deformity.      Cervical back: Neck supple.      " Right lower leg: No edema.      Left lower leg: No edema.     Skin:     General: Skin is warm and dry.      Coloration: Skin is not jaundiced or pale.     Neurological:      Mental Status: She is alert.      Motor: No weakness.      Gait: Gait normal.     Psychiatric:         Behavior: Behavior normal.         Thought Content: Thought content normal.                [1]   Family History  Problem Relation Name Age of Onset    Diabetes Mother Dulcie Alolor     Hypertension Mother Dulcie Alolor     Allergy (severe) Mother Dulcie Alolor         shellfish    Rectal cancer Father Alexy Alolor 77    Hypertension Father Alexy Alolor     Allergy (severe) Father Alexy Alolor         shellsih    Cancer Maternal Uncle Alee Reddy    [2]   Social History  Tobacco Use    Smoking status: Never     Passive exposure: Past    Smokeless tobacco: Never   Vaping Use    Vaping status: Never Used   Substance Use Topics    Alcohol use: Yes     Alcohol/week: 2.0 standard drinks of alcohol     Types: 2 Cans of beer per week     Comment: Occasional    Drug use: Never

## 2025-07-08 ENCOUNTER — APPOINTMENT (OUTPATIENT)
Dept: LAB | Facility: CLINIC | Age: 50
End: 2025-07-08
Attending: FAMILY MEDICINE
Payer: COMMERCIAL

## 2025-07-08 DIAGNOSIS — T78.3XXD ANGIOEDEMA, SUBSEQUENT ENCOUNTER: ICD-10-CM

## 2025-07-08 PROCEDURE — 86003 ALLG SPEC IGE CRUDE XTRC EA: CPT

## 2025-07-08 PROCEDURE — 36415 COLL VENOUS BLD VENIPUNCTURE: CPT

## 2025-07-08 NOTE — ASSESSMENT & PLAN NOTE
Been to allergist  Trigger unknown   On zyrtec  Offer seafood rast  Orders:    Allergen Shellfish Panel; Future

## 2025-07-08 NOTE — PATIENT INSTRUCTIONS
"Patient Education     Routine physical for adults   The Basics   Written by the doctors and editors at [facility]   What is a physical? -- A physical is a routine visit, or \"check-up,\" with your doctor. You might also hear it called a \"wellness visit\" or \"preventive visit.\"  During each visit, the doctor will:   Ask about your physical and mental health   Ask about your habits, behaviors, and lifestyle   Do an exam   Give you vaccines if needed   Talk to you about any medicines you take   Give advice about your health   Answer your questions  Getting regular check-ups is an important part of taking care of your health. It can help your doctor find and treat any problems you have. But it's also important for preventing health problems.  A routine physical is different from a \"sick visit.\" A sick visit is when you see a doctor because of a health concern or problem. Since physicals are scheduled ahead of time, you can think about what you want to ask the doctor.  How often should I get a physical? -- It depends on your age and health. In general, for people age 21 years and older:   If you are younger than 50 years, you might be able to get a physical every 3 years.   If you are 50 years or older, your doctor might recommend a physical every year.  If you have an ongoing health condition, like diabetes or high blood pressure, your doctor will probably want to see you more often.  What happens during a physical? -- In general, each visit will include:   Physical exam - The doctor or nurse will check your height, weight, heart rate, and blood pressure. They will also look at your eyes and ears. They will ask about how you are feeling and whether you have any symptoms that bother you.   Medicines - It's a good idea to bring a list of all the medicines you take to each doctor visit. Your doctor will talk to you about your medicines and answer any questions. Tell them if you are having any side effects that bother you. You " "should also tell them if you are having trouble paying for any of your medicines.   Habits and behaviors - This includes:   Your diet   Your exercise habits   Whether you smoke, drink alcohol, or use drugs   Whether you are sexually active   Whether you feel safe at home  Your doctor will talk to you about things you can do to improve your health and lower your risk of health problems. They will also offer help and support. For example, if you want to quit smoking, they can give you advice and might prescribe medicines. If you want to improve your diet or get more physical activity, they can help you with this, too.   Lab tests, if needed - The tests you get will depend on your age and situation. For example, your doctor might want to check your:   Cholesterol   Blood sugar   Iron level   Vaccines - The recommended vaccines will depend on your age, health, and what vaccines you already had. Vaccines are very important because they can prevent certain serious or deadly infections.   Discussion of screening - \"Screening\" means checking for diseases or other health problems before they cause symptoms. Your doctor can recommend screening based on your age, risk, and preferences. This might include tests to check for:   Cancer, such as breast, prostate, cervical, ovarian, colorectal, prostate, lung, or skin cancer   Sexually transmitted infections, such as chlamydia and gonorrhea   Mental health conditions like depression and anxiety  Your doctor will talk to you about the different types of screening tests. They can help you decide which screenings to have. They can also explain what the results might mean.   Answering questions - The physical is a good time to ask the doctor or nurse questions about your health. If needed, they can refer you to other doctors or specialists, too.  Adults older than 65 years often need other care, too. As you get older, your doctor will talk to you about:   How to prevent falling at " home   Hearing or vision tests   Memory testing   How to take your medicines safely   Making sure that you have the help and support you need at home  All topics are updated as new evidence becomes available and our peer review process is complete.  This topic retrieved from [facility] on: May 02, 2024.  Topic 192506 Version 1.0  Release: 32.4.3 - C32.122  © 2024 UpToDate, Inc. and/or its affiliates. All rights reserved.  Consumer Information Use and Disclaimer   Disclaimer: This generalized information is a limited summary of diagnosis, treatment, and/or medication information. It is not meant to be comprehensive and should be used as a tool to help the user understand and/or assess potential diagnostic and treatment options. It does NOT include all information about conditions, treatments, medications, side effects, or risks that may apply to a specific patient. It is not intended to be medical advice or a substitute for the medical advice, diagnosis, or treatment of a health care provider based on the health care provider's examination and assessment of a patient's specific and unique circumstances. Patients must speak with a health care provider for complete information about their health, medical questions, and treatment options, including any risks or benefits regarding use of medications. This information does not endorse any treatments or medications as safe, effective, or approved for treating a specific patient. UpToDate, Inc. and its affiliates disclaim any warranty or liability relating to this information or the use thereof.The use of this information is governed by the Terms of Use, available at https://www.wolters7billionideasuwer.com/en/know/clinical-effectiveness-terms. 2024© UpToDate, Inc. and its affiliates and/or licensors. All rights reserved.  Copyright   © 2024 UpToDate, Inc. and/or its affiliates. All rights reserved.

## 2025-07-17 LAB
CLAM IGE QN: <0.1 KUA/L (ref 0–0.1)
CRAB IGE QN: <0.1 KUA/L (ref 0–0.1)
LOBSTER IGE QN: <0.1 KUA/L (ref 0–0.1)
OYSTER IGE QN: <0.1 KUA/L (ref 0–0.1)
SCALLOP IGE QN: <0.1 KUA/L (ref 0–0.1)
SHRIMP IGE QN: <0.1 KUA/L (ref 0–0.1)
TOTAL IGE SMQN RAST: 19.7 KU/L (ref 0–113)

## 2025-07-21 ENCOUNTER — OFFICE VISIT (OUTPATIENT)
Dept: OBGYN CLINIC | Facility: CLINIC | Age: 50
End: 2025-07-21
Payer: COMMERCIAL

## 2025-07-21 VITALS — WEIGHT: 128 LBS | SYSTOLIC BLOOD PRESSURE: 122 MMHG | DIASTOLIC BLOOD PRESSURE: 78 MMHG | BODY MASS INDEX: 24.19 KG/M2

## 2025-07-21 DIAGNOSIS — N80.9 ENDOMETRIOSIS: Primary | ICD-10-CM

## 2025-07-21 PROCEDURE — 99213 OFFICE O/P EST LOW 20 MIN: CPT | Performed by: STUDENT IN AN ORGANIZED HEALTH CARE EDUCATION/TRAINING PROGRAM

## 2025-07-21 NOTE — PROGRESS NOTES
Name: Marti Sethi      : 1975      MRN: [id number]  Encounter Provider: Masha Shaikh MD  Encounter Date: 2025   Encounter department: [facility]  :  Assessment & Plan  Endometriosis  Marti is a 42-year-old G2, P2 perimenopausal female who presents as follow-up for endometriosis that was noted following laparoscopic right oophorectomy 3/25/2025.  Patient was initiated on OCPs to help reduce risks of recurrence and need for future surgery, however, discontinued after starting in May as she was experiencing elevated blood pressures, headaches and dizziness.  Symptoms improved following discontinuing.  We reviewed progestin only medications assist with pain, however, Marti has never really had pain.  The plan of starting OCPs was to help reduce recurrence of cysts and need for future surgeries given the amount of adhesive disease in the pelvis.  We reviewed progestin only medication such as Depo-Provera and Nexplanon to help assist with decreasing ovulation and we also reviewed POPS.    At this time patient would prefer not to be on any medication as it is prophylactic in nature and follow-up at her annual exam.         History of Present Illness   HPI  Marti Sethi is a 50 y.o. female who presents for OCP follow-up-patient was diagnosed with endometriosis during laparoscopy on 3/25/2025 with a large amount of adhesive disease in the pelvis.  OCPs were initiated to help assist patient with risk of recurrence of endometriomas and decreased ovulation.  When taking OCPs patient started experiencing cramping and irregular spotting as well as headaches and dizziness.  She reports elevated blood pressures with systolics in the 150s and called the office and at that time was advised to discontinue OCPs.  Patient reports since stopping OCP she has felt much improved and would prefer not to go on any other hormonal medications at this time.    History obtained  from: patient    Review of Systems  Per HPI     Objective   /78 (BP Location: Right arm, Patient Position: Sitting, Cuff Size: Standard)   Wt 58.1 kg (128 lb)   LMP 07/13/2025 (Exact Date)   BMI 24.19 kg/m²      Physical Exam  Vitals reviewed.   Constitutional:       Appearance: Normal appearance. She is not ill-appearing or diaphoretic.   HENT:      Head: Atraumatic.     Cardiovascular:      Rate and Rhythm: Normal rate.   Pulmonary:      Effort: Pulmonary effort is normal. No respiratory distress.   Abdominal:      Palpations: Abdomen is soft.      Tenderness: There is no abdominal tenderness. There is no guarding or rebound.      Comments: Incisions from prior laparoscopy well-healed     Musculoskeletal:      Right lower leg: No edema.      Left lower leg: No edema.     Skin:     General: Skin is warm and dry.     Neurological:      Mental Status: She is alert and oriented to person, place, and time.     Psychiatric:         Mood and Affect: Mood normal.         Behavior: Behavior normal.

## 2025-07-21 NOTE — ASSESSMENT & PLAN NOTE
Marti is a 42-year-old G2, P2 perimenopausal female who presents as follow-up for endometriosis that was noted following laparoscopic right oophorectomy 3/25/2025.  Patient was initiated on OCPs to help reduce risks of recurrence and need for future surgery, however, discontinued after starting in May as she was experiencing elevated blood pressures, headaches and dizziness.  Symptoms improved following discontinuing.  We reviewed progestin only medications assist with pain, however, Marti has never really had pain.  The plan of starting OCPs was to help reduce recurrence of cysts and need for future surgeries given the amount of adhesive disease in the pelvis.  We reviewed progestin only medication such as Depo-Provera and Nexplanon to help assist with decreasing ovulation and we also reviewed POPS.    At this time patient would prefer not to be on any medication as it is prophylactic in nature and follow-up at her annual exam.

## (undated) DEVICE — CHLORHEXIDINE 4PCT 4 OZ

## (undated) DEVICE — GLOVE PI ULTRA TOUCH SZ.7.0

## (undated) DEVICE — INTENDED FOR TISSUE SEPARATION, AND OTHER PROCEDURES THAT REQUIRE A SHARP SURGICAL BLADE TO PUNCTURE OR CUT.: Brand: BARD-PARKER SAFETY BLADES SIZE 11, STERILE

## (undated) DEVICE — PREMIUM DRY TRAY LF: Brand: MEDLINE INDUSTRIES, INC.

## (undated) DEVICE — GLOVE INDICATOR PI UNDERGLOVE SZ 7.5 BLUE

## (undated) DEVICE — TISSUE RETRIEVAL SYSTEM: Brand: INZII RETRIEVAL SYSTEM

## (undated) DEVICE — ADHESIVE SKIN HIGH VISCOSITY EXOFIN 1ML

## (undated) DEVICE — HEMOSTATIC MATRIX SURGIFLO 8ML W/THROMBIN

## (undated) DEVICE — TUBING SMOKE EVAC W/FILTRATION DEVICE PLUMEPORT ACTIV

## (undated) DEVICE — LAPAROSCOPIC TROCAR SLEEVE/SINGLE USE: Brand: KII® OPTICAL ACCESS SYSTEM

## (undated) DEVICE — Device: Brand: OMNICLOSE TROCAR SITE CLOSURE DEVICE

## (undated) DEVICE — CHLORAPREP HI-LITE 26ML ORANGE

## (undated) DEVICE — MARYLAND JAW LAPAROSCOPIC SEALER/DIVIDER COATED: Brand: LIGASURE

## (undated) DEVICE — SURGIFLO ENDOSCOPIC APPICATOR: Brand: ETHICON

## (undated) DEVICE — SUT MONOCRYL 4-0 PS-2 18 IN Y496G

## (undated) DEVICE — BETHLEHEM UNIVERSAL GYN LAP PK: Brand: CARDINAL HEALTH